# Patient Record
Sex: FEMALE | Race: WHITE | NOT HISPANIC OR LATINO | URBAN - METROPOLITAN AREA
[De-identification: names, ages, dates, MRNs, and addresses within clinical notes are randomized per-mention and may not be internally consistent; named-entity substitution may affect disease eponyms.]

---

## 2017-03-02 ENCOUNTER — OFFICE VISIT (OUTPATIENT)
Dept: MEDICAL GROUP | Facility: CLINIC | Age: 47
End: 2017-03-02
Payer: OTHER MISCELLANEOUS

## 2017-03-02 VITALS
HEART RATE: 81 BPM | BODY MASS INDEX: 35.98 KG/M2 | TEMPERATURE: 98.4 F | WEIGHT: 190.6 LBS | DIASTOLIC BLOOD PRESSURE: 62 MMHG | OXYGEN SATURATION: 99 % | RESPIRATION RATE: 16 BRPM | HEIGHT: 61 IN | SYSTOLIC BLOOD PRESSURE: 110 MMHG

## 2017-03-02 DIAGNOSIS — R21 SKIN RASH: ICD-10-CM

## 2017-03-02 PROCEDURE — 99213 OFFICE O/P EST LOW 20 MIN: CPT | Performed by: NURSE PRACTITIONER

## 2017-03-02 RX ORDER — TRIAMCINOLONE ACETONIDE 0.25 MG/G
1 CREAM TOPICAL 2 TIMES DAILY
Qty: 1 TUBE | Refills: 0 | Status: SHIPPED | OUTPATIENT
Start: 2017-03-02 | End: 2017-11-17 | Stop reason: SDUPTHER

## 2017-03-02 ASSESSMENT — ENCOUNTER SYMPTOMS
TINGLING: 0
CHILLS: 0
FEVER: 0

## 2017-03-02 NOTE — MR AVS SNAPSHOT
"        Nanda Brenda   3/2/2017 2:20 PM   Office Visit   MRN: [id number]    Department:  [facility]   Dept Phone:  [phone]    Description:  Female : 1970   Provider:  HARJEET Howard           Reason for Visit     Rash abdomen area very itchy      Allergies as of 3/2/2017     Allergen Noted Reactions    Pcn [Penicillins] 2014       Childhood reaction      You were diagnosed with     Skin rash   [625280]         Vital Signs     Blood Pressure Pulse Temperature Respirations Height Weight    110/62 mmHg 81 36.9 °C (98.4 °F) 16 1.549 m (5' 1\") 86.456 kg (190 lb 9.6 oz)    Body Mass Index Oxygen Saturation Breastfeeding? Smoking Status          36.03 kg/m2 99% No Never Smoker         Basic Information     Date Of Birth Sex Race Ethnicity Preferred Language    1970 Female White Non- English      Your appointments     May 19, 2017  8:40 AM   ANNUAL EXAM PREVENTATIVE with Demi Ware A.P.NRose Mary   [address] [address] [phone]              Problem List              ICD-10-CM Priority Class Noted - Resolved    Chronic headaches R51   2014 - Present    Iron deficiency anemia D50.9   5/10/2014 - Present    Vitamin D deficiency E55.9   5/10/2014 - Present    Dyslipidemia E78.5   5/10/2014 - Present    Thoracic back pain M54.6   2014 - Present    Prediabetes R73.03   2014 - Present    BMI 37.0-37.9, adult Z68.37   2015 - Present    Bowel habit changes R19.4   2015 - Present    Elevated liver enzymes R74.8   2015 - Present    Acute maxillary sinusitis J01.00   2015 - Present    Low back pain M54.5   2015 - Present    Abnormal urine odor R82.90   2015 - Present      Health Maintenance        Date Due Completion Dates    IMM DTaP/Tdap/Td Vaccine (1 - Tdap) 10/24/1989 ---    MAMMOGRAM 2013 (Prv Comp)    Override on 2012: Previously completed (normal)   " PAP SMEAR 4/30/2015 4/30/2012 (Prv Comp)    Override on 4/30/2012: Previously completed (Andrés ablation; was seeing GYN in [place])    IMM INFLUENZA (1) 9/1/2016 ---            Current Immunizations     No immunizations on file.      Below and/or attached are the medications your provider expects you to take. Review all of your home medications and newly ordered medications with your provider and/or pharmacist. Follow medication instructions as directed by your provider and/or pharmacist. Please keep your medication list with you and share with your provider. Update the information when medications are discontinued, doses are changed, or new medications (including over-the-counter products) are added; and carry medication information at all times in the event of emergency situations     Allergies:  PCN - (reactions not documented)               Medications  Valid as of: March 02, 2017 -  3:34 PM    Generic Name Brand Name Tablet Size Instructions for use    Albuterol Sulfate (Aero Soln) albuterol 108 (90 BASE) MCG/ACT Inhale 2 Puffs by mouth every 6 hours as needed for Shortness of Breath.        Benzonatate (Cap) TESSALON 100 MG Take 100 mg by mouth 3 times a day as needed for Cough.        Cefuroxime Axetil (Tab) CEFTIN 250 MG Take 1 Tab by mouth 2 times a day.        Ibuprofen (Tab) MOTRIN 800 MG Take 1 Tab by mouth every 8 hours as needed. AS NEEDED FOR MILD PAIN        Phenyleph-Promethazine-Cod (Syrup) PHENERGAN VC CODEINE 5-6.25-10 MG/5ML Take 5 mL by mouth every 8 hours as needed.        Triamcinolone Acetonide (Cream) KENALOG 0.025 % Apply 1 Application to affected area(s) 2 times a day.        .                 Medicines prescribed today were sent to:     [facility]  [place] - [address]    [address] [place] [place] [address]    Phone: [phone] Fax: [phone]    Open 24 Hours?: No    [facility] [place] - [address]    [address]  [place] [address]    Phone: [phone] Fax: [phone]    Open 24 Hours?: No      Medication refill instructions:       If your prescription bottle indicates you have medication refills left, it is not necessary to call your provider’s office. Please contact your pharmacy and they will refill your medication.    If your prescription bottle indicates you do not have any refills left, you may request refills at any time through one of the following ways: The online [employer] system (except Urgent Care), by calling your provider’s office, or by asking your pharmacy to contact your provider’s office with a refill request. Medication refills are processed only during regular business hours and may not be available until the next business day. Your provider may request additional information or to have a follow-up visit with you prior to refilling your medication.   *Please Note: Medication refills are assigned a new Rx number when refilled electronically. Your pharmacy may indicate that no refills were authorized even though a new prescription for the same medication is available at the pharmacy. Please request the medicine by name with the pharmacy before contacting your provider for a refill.           [employer] Access Code: Activation code not generated  Current [employer] Status: Active

## 2017-03-02 NOTE — PROGRESS NOTES
Chief Complaint   Patient presents with   • Rash     abdomen area very itchy       HISTORY OF PRESENT ILLNESS: Patient is a 46 y.o. female established patient who presents today due to rashes on her abdomen. Patient reported that she gets rashes here and there intermittently. Last time she got rashes on her elbow and she was given some cream which she does not remember the name and the rashes went away. The patient reported that she started feeling itchiness and notice rashes about a few days ago. She denies any change to laundry product, cosmetic products, no new underwear, she washes her pen every day. She tries to put some lotion that the itchiness is still there. However the rashes that seem to go down a little bit. She denies fever or chills or any burning sensation tingling.     Patient Active Problem List    Diagnosis Date Noted   • Acute maxillary sinusitis 12/08/2015   • Low back pain 12/08/2015   • Abnormal urine odor 12/08/2015   • BMI 37.0-37.9, adult 07/07/2015   • Bowel habit changes 07/07/2015   • Elevated liver enzymes 07/07/2015   • Prediabetes 12/30/2014   • Thoracic back pain 12/22/2014   • Iron deficiency anemia 05/10/2014   • Vitamin D deficiency 05/10/2014   • Dyslipidemia 05/10/2014   • Chronic headaches 04/30/2014       Allergies:Pcn    Current Outpatient Prescriptions   Medication Sig Dispense Refill   • ibuprofen (MOTRIN) 800 MG Tab Take 1 Tab by mouth every 8 hours as needed. AS NEEDED FOR MILD PAIN 90 Tab 0   • benzonatate (TESSALON) 100 MG Cap Take 100 mg by mouth 3 times a day as needed for Cough.     • cefUROXime (CEFTIN) 250 MG Tab Take 1 Tab by mouth 2 times a day. 20 Tab 0   • albuterol (VENTOLIN OR PROVENTIL) 108 (90 BASE) MCG/ACT Aero Soln inhalation aerosol Inhale 2 Puffs by mouth every 6 hours as needed for Shortness of Breath. 8.5 g 3   • prometh-phenylephrine-codeine (PHENERGAN VC CODEINE) 5-6.25-10 MG/5ML Syrup Take 5 mL by mouth every 8 hours as needed. 120 mL 0     No  "current facility-administered medications for this visit.       Social History   Substance Use Topics   • Smoking status: Never Smoker    • Smokeless tobacco: Never Used   • Alcohol Use: No       No family status information on file.     Family History   Problem Relation Age of Onset   • Cancer Maternal Grandmother 56     pancreatic   • Heart Disease Maternal Grandfather 40     MI   • Diabetes Paternal Grandmother    • Stroke Neg Hx          ROS:  Review of Systems   Constitutional: Negative for fever and chills.   Skin: Positive for itching and rash.   Neurological: Negative for tingling.        Objective:     Blood pressure 110/62, pulse 81, temperature 36.9 °C (98.4 °F), resp. rate 16, height 1.549 m (5' 1\"), weight 86.456 kg (190 lb 9.6 oz), SpO2 99 %, not currently breastfeeding.     Physical Exam:  Physical Exam   Constitutional: She is oriented to person, place, and time and well-developed, well-nourished, and in no distress.   HENT:   Head: Normocephalic and atraumatic.   Eyes: Conjunctivae are normal.   Neck: Neck supple.   Cardiovascular: Normal rate.    Abdominal: Soft. She exhibits no distension. There is no tenderness.       Small rashes, flat on the circled areas, the area is lining up the level of her wes waistband.    Musculoskeletal: Normal range of motion.   Neurological: She is alert and oriented to person, place, and time.   Skin: Skin is warm. Rash noted. No erythema.   Vitals reviewed.        Assessment/Plan:  1. Skin rash, most likely contact dermitits  - triamcinolone acetonide (KENALOG) 0.025 % Cream; Apply 1 Application to affected area(s) 2 times a day.  Dispense: 1 Tube; Refill: 0       "

## 2017-03-06 ENCOUNTER — PATIENT MESSAGE (OUTPATIENT)
Dept: MEDICAL GROUP | Facility: PHYSICIAN GROUP | Age: 47
End: 2017-03-06

## 2017-03-06 DIAGNOSIS — D50.8 IRON DEFICIENCY ANEMIA SECONDARY TO INADEQUATE DIETARY IRON INTAKE: ICD-10-CM

## 2017-03-07 ENCOUNTER — HOSPITAL ENCOUNTER (OUTPATIENT)
Dept: LAB | Facility: MEDICAL CENTER | Age: 47
End: 2017-03-07
Attending: NURSE PRACTITIONER
Payer: OTHER MISCELLANEOUS

## 2017-03-07 DIAGNOSIS — D50.8 IRON DEFICIENCY ANEMIA SECONDARY TO INADEQUATE DIETARY IRON INTAKE: ICD-10-CM

## 2017-03-07 LAB
BASOPHILS # BLD AUTO: 0.05 K/UL (ref 0–0.12)
BASOPHILS NFR BLD AUTO: 0.5 % (ref 0–1.8)
EOSINOPHIL # BLD: 0.49 K/UL (ref 0–0.51)
EOSINOPHIL NFR BLD AUTO: 5.2 % (ref 0–6.9)
ERYTHROCYTE [DISTWIDTH] IN BLOOD BY AUTOMATED COUNT: 44.4 FL (ref 35.9–50)
HCT VFR BLD AUTO: 38.6 % (ref 37–47)
HGB BLD-MCNC: 12.1 G/DL (ref 12–16)
IMM GRANULOCYTES # BLD AUTO: 0.02 K/UL (ref 0–0.11)
IMM GRANULOCYTES NFR BLD AUTO: 0.2 % (ref 0–0.9)
LYMPHOCYTES # BLD: 2.78 K/UL (ref 1–4.8)
LYMPHOCYTES NFR BLD AUTO: 29.5 % (ref 22–41)
MCH RBC QN AUTO: 26.6 PG (ref 27–33)
MCHC RBC AUTO-ENTMCNC: 31.3 G/DL (ref 33.6–35)
MCV RBC AUTO: 84.8 FL (ref 81.4–97.8)
MONOCYTES # BLD: 0.49 K/UL (ref 0–0.85)
MONOCYTES NFR BLD AUTO: 5.2 % (ref 0–13.4)
NEUTROPHILS # BLD: 5.6 K/UL (ref 2–7.15)
NEUTROPHILS NFR BLD AUTO: 59.4 % (ref 44–72)
NRBC # BLD AUTO: 0 K/UL
NRBC BLD-RTO: 0 /100 WBC
PLATELET # BLD AUTO: 341 K/UL (ref 164–446)
PMV BLD AUTO: 11.6 FL (ref 9–12.9)
RBC # BLD AUTO: 4.55 M/UL (ref 4.2–5.4)
T4 FREE SERPL-MCNC: 0.8 NG/DL (ref 0.53–1.43)
TSH SERPL DL<=0.005 MIU/L-ACNC: 3.56 UIU/ML (ref 0.3–3.7)
WBC # BLD AUTO: 9.4 K/UL (ref 4.8–10.8)

## 2017-03-07 PROCEDURE — 85025 COMPLETE CBC W/AUTO DIFF WBC: CPT

## 2017-03-07 PROCEDURE — 36415 COLL VENOUS BLD VENIPUNCTURE: CPT

## 2017-03-07 PROCEDURE — 84439 ASSAY OF FREE THYROXINE: CPT

## 2017-03-07 PROCEDURE — 84443 ASSAY THYROID STIM HORMONE: CPT

## 2017-05-19 ENCOUNTER — HOSPITAL ENCOUNTER (OUTPATIENT)
Facility: MEDICAL CENTER | Age: 47
End: 2017-05-19
Attending: NURSE PRACTITIONER
Payer: OTHER MISCELLANEOUS

## 2017-05-19 ENCOUNTER — OFFICE VISIT (OUTPATIENT)
Dept: MEDICAL GROUP | Facility: PHYSICIAN GROUP | Age: 47
End: 2017-05-19
Payer: OTHER MISCELLANEOUS

## 2017-05-19 VITALS
BODY MASS INDEX: 36.25 KG/M2 | TEMPERATURE: 97.3 F | WEIGHT: 192 LBS | HEIGHT: 61 IN | SYSTOLIC BLOOD PRESSURE: 118 MMHG | RESPIRATION RATE: 16 BRPM | OXYGEN SATURATION: 99 % | DIASTOLIC BLOOD PRESSURE: 74 MMHG | HEART RATE: 85 BPM

## 2017-05-19 DIAGNOSIS — Z01.419 WELL WOMAN EXAM: ICD-10-CM

## 2017-05-19 DIAGNOSIS — G44.229 CHRONIC TENSION-TYPE HEADACHE, NOT INTRACTABLE: ICD-10-CM

## 2017-05-19 DIAGNOSIS — G47.19 EXCESSIVE DAYTIME SLEEPINESS: ICD-10-CM

## 2017-05-19 DIAGNOSIS — R74.8 ELEVATED LIVER ENZYMES: ICD-10-CM

## 2017-05-19 DIAGNOSIS — E04.9 ENLARGED THYROID: ICD-10-CM

## 2017-05-19 DIAGNOSIS — M54.6 CHRONIC THORACIC BACK PAIN, UNSPECIFIED BACK PAIN LATERALITY: ICD-10-CM

## 2017-05-19 DIAGNOSIS — E55.9 VITAMIN D DEFICIENCY: ICD-10-CM

## 2017-05-19 DIAGNOSIS — G89.29 CHRONIC THORACIC BACK PAIN, UNSPECIFIED BACK PAIN LATERALITY: ICD-10-CM

## 2017-05-19 LAB — CYTOLOGY REG CYTOL: NORMAL

## 2017-05-19 PROCEDURE — 99214 OFFICE O/P EST MOD 30 MIN: CPT | Performed by: NURSE PRACTITIONER

## 2017-05-19 PROCEDURE — 88175 CYTOPATH C/V AUTO FLUID REDO: CPT

## 2017-05-19 ASSESSMENT — PATIENT HEALTH QUESTIONNAIRE - PHQ9: CLINICAL INTERPRETATION OF PHQ2 SCORE: 0

## 2017-05-19 NOTE — MR AVS SNAPSHOT
"        Nanda Brenda   2017 8:40 AM   Office Visit   MRN: [id number]    Department:  [facility]   Dept Phone:  [phone]    Description:  Female : 1970   Provider:  JUSTO Singh           Reason for Visit     Gynecologic Exam mammo order needed      Allergies as of 2017     Allergen Noted Reactions    Pcn [Penicillins] 2014       Childhood reaction      You were diagnosed with     Chronic tension-type headache, not intractable   [328703]       Vitamin D deficiency   [6740038]       Chronic thoracic back pain, unspecified back pain laterality   [3943863]       Elevated liver enzymes   [359630]       Excessive daytime sleepiness   [5813000]       Well woman exam   [289883]       Enlarged thyroid   [689349]         Vital Signs     Blood Pressure Pulse Temperature Respirations Height Weight    118/74 mmHg 85 36.3 °C (97.3 °F) 16 1.549 m (5' 0.98\") 87.091 kg (192 lb)    Body Mass Index Oxygen Saturation Smoking Status             36.30 kg/m2 99% Never Smoker          Basic Information     Date Of Birth Sex Race Ethnicity Preferred Language    1970 Female White Non- English      Problem List              ICD-10-CM Priority Class Noted - Resolved    Chronic headaches R51   2014 - Present    Iron deficiency anemia D50.9   5/10/2014 - Present    Vitamin D deficiency E55.9   5/10/2014 - Present    Dyslipidemia E78.5   5/10/2014 - Present    Thoracic back pain M54.6   2014 - Present    Elevated blood sugar R73.9   2014 - Present    BMI 37.0-37.9, adult Z68.37   2015 - Present    Bowel habit changes R19.4   2015 - Present    Elevated liver enzymes R74.8   2015 - Present    Low back pain M54.5   2015 - Present    Excessive daytime sleepiness G47.19   2017 - Present    Well woman exam Z01.419   2017 - Present    Enlarged thyroid E01.0   2017 - Present      Health Maintenance        Date Due Completion Dates    IMM " DTaP/Tdap/Td Vaccine (1 - Tdap) 10/24/1989 ---    MAMMOGRAM 4/30/2013 4/30/2012 (Prv Comp)    Override on 4/30/2012: Previously completed (normal)    PAP SMEAR 4/30/2015 4/30/2012 (Prv Comp)    Override on 4/30/2012: Previously completed (Novasure ablation; was seeing GYN in [place])            Current Immunizations     No immunizations on file.      Below and/or attached are the medications your provider expects you to take. Review all of your home medications and newly ordered medications with your provider and/or pharmacist. Follow medication instructions as directed by your provider and/or pharmacist. Please keep your medication list with you and share with your provider. Update the information when medications are discontinued, doses are changed, or new medications (including over-the-counter products) are added; and carry medication information at all times in the event of emergency situations     Allergies:  PCN - (reactions not documented)               Medications  Valid as of: May 19, 2017 -  9:11 AM    Generic Name Brand Name Tablet Size Instructions for use    Albuterol Sulfate (Aero Soln) albuterol 108 (90 BASE) MCG/ACT Inhale 2 Puffs by mouth every 6 hours as needed for Shortness of Breath.        Benzonatate (Cap) TESSALON 100 MG Take 100 mg by mouth 3 times a day as needed for Cough.        Cefuroxime Axetil (Tab) CEFTIN 250 MG Take 1 Tab by mouth 2 times a day.        Ibuprofen (Tab) MOTRIN 800 MG Take 1 Tab by mouth every 8 hours as needed. AS NEEDED FOR MILD PAIN        NON SPECIFIED   Overnight Oximetry.  Sleep disturbance  Daytime sleepiness        Phenyleph-Promethazine-Cod (Syrup) PHENERGAN VC CODEINE 5-6.25-10 MG/5ML Take 5 mL by mouth every 8 hours as needed.        Triamcinolone Acetonide (Cream) KENALOG 0.025 % Apply 1 Application to affected area(s) 2 times a day.        .                 Medicines prescribed today were sent to:     [facility] - [place] - [address]     [address] [place] [address]    Phone: [phone] Fax: [phone]    Open 24 Hours?: No    [facility] [place] - [address]    [address] [place] [address]    Phone: [phone] Fax: [phone]    Open 24 Hours?: No      Medication refill instructions:       If your prescription bottle indicates you have medication refills left, it is not necessary to call your provider’s office. Please contact your pharmacy and they will refill your medication.    If your prescription bottle indicates you do not have any refills left, you may request refills at any time through one of the following ways: The online [employer] system (except Urgent Care), by calling your provider’s office, or by asking your pharmacy to contact your provider’s office with a refill request. Medication refills are processed only during regular business hours and may not be available until the next business day. Your provider may request additional information or to have a follow-up visit with you prior to refilling your medication.   *Please Note: Medication refills are assigned a new Rx number when refilled electronically. Your pharmacy may indicate that no refills were authorized even though a new prescription for the same medication is available at the pharmacy. Please request the medicine by name with the pharmacy before contacting your provider for a refill.        Your To Do List     Future Labs/Procedures Complete By Expires    COMP METABOLIC PANEL  As directed 5/19/2018    MA-SCREEN MAMMO W/CAD-BILAT  As directed 5/19/2018    US-SOFT TISSUES OF HEAD - NECK  As directed 5/19/2018      Referral     A referral request has been sent to our patient care coordination department. Please allow 3-5 business days for us to process this request and contact you either by phone or mail. If you do not hear from us by the 5th business day, please call us at [phone].           [employer] Access Code:  Activation code not generated  Current MyChart Status: Active

## 2017-05-19 NOTE — PROGRESS NOTES
S:  Nanda Gutierrez is a 46 y.o.,femalewho presents today for her Pap and GYN exam.  Her LMP was six years ago.  She is still having no menses, due to ablation.  Her form of contraception is  none    Chronic headaches  Patient has better control of headaches. She states that the headaches are less.     Vitamin D deficiency  Patient has difficulty remembering to take her pills.     Thoracic back pain  Patient has chronic pain to mid back, but has noticed that she has lower back pain. The pain is worsened by housework. Patient has not attended PT.     Elevated liver enzymes  Patient went to GI. Full work up. Has fatty liver disease.     Excessive daytime sleepiness  Patient complains of daytime sleepiness. She feels fully exhausted when she wakes up. She sleeps through the night.       She is , P:0.    She has had an Abnormal Pap previously.  Her last Mammogram was done: due at 34 yo will order.     Her preventative health screens are not up to date.  GYN ROS:  normal menses, no abnormal bleeding, pelvic pain or discharge, no breast pain or new or enlarging lumps on self exam, she complains of dry vaginal tissue during intercourse.    Patient Active Problem List    Diagnosis Date Noted   • Excessive daytime sleepiness 2017   • Well woman exam 2017   • Low back pain 2015   • BMI 37.0-37.9, adult 2015   • Bowel habit changes 2015   • Elevated liver enzymes 2015   • Elevated blood sugar 2014   • Thoracic back pain 2014   • Iron deficiency anemia 05/10/2014   • Vitamin D deficiency 05/10/2014   • Dyslipidemia 05/10/2014   • Chronic headaches 2014     Current Outpatient Prescriptions on File Prior to Visit   Medication Sig Dispense Refill   • triamcinolone acetonide (KENALOG) 0.025 % Cream Apply 1 Application to affected area(s) 2 times a day. 1 Tube 0   • ibuprofen (MOTRIN) 800 MG Tab Take 1 Tab by mouth every 8 hours as needed. AS NEEDED FOR MILD PAIN 90 Tab 0   •  "benzonatate (TESSALON) 100 MG Cap Take 100 mg by mouth 3 times a day as needed for Cough.     • cefUROXime (CEFTIN) 250 MG Tab Take 1 Tab by mouth 2 times a day. 20 Tab 0   • albuterol (VENTOLIN OR PROVENTIL) 108 (90 BASE) MCG/ACT Aero Soln inhalation aerosol Inhale 2 Puffs by mouth every 6 hours as needed for Shortness of Breath. 8.5 g 3   • prometh-phenylephrine-codeine (PHENERGAN VC CODEINE) 5-6.25-10 MG/5ML Syrup Take 5 mL by mouth every 8 hours as needed. 120 mL 0     No current facility-administered medications on file prior to visit.     Social History   Substance Use Topics   • Smoking status: Never Smoker    • Smokeless tobacco: Never Used   • Alcohol Use: No       O: /74 mmHg  Pulse 85  Temp(Src) 36.3 °C (97.3 °F)  Resp 16  Ht 1.549 m (5' 0.98\")  Wt 87.091 kg (192 lb)  BMI 36.30 kg/m2  SpO2 99%  Vitals Noted and Reviewed  Neck: enlarged thyroid and tender  Breast: breasts symmetric and no dominant or suspicious mass  Lungs: normal to percussion and auscultation  Heart: normal  Vulva: grossly unremarkable  Vagina: no abnormal discharge  Cervix: Parous, nonfriable, no surface lesions identified, Pap was performed  Uterus: Normal shape, position and consistency  Bimanual exam: No uteromegaly, negative chandelier sign, adnexa freely movable and without enlargements bilaterally  Rectal: not performed    Assessment:  NORMAL GYN Exam  Enlarged thyroid.      Plan:   mammogram  pap smear  US for neck will notify of results  OPO for daytime sleepiness   REPLENS OTC for vaginal dryness  PT for low back pain.   return annually or prn    Pap processed and sent to the lab.    Recommend follow up 6 months or prn.      "

## 2017-05-19 NOTE — ASSESSMENT & PLAN NOTE
Patient complains of daytime sleepiness. She feels fully exhausted when she wakes up. She sleeps through the night.

## 2017-05-19 NOTE — ASSESSMENT & PLAN NOTE
Patient has chronic pain to mid back, but has noticed that she has lower back pain. The pain is worsened by housework. Patient has not attended PT.

## 2017-05-22 ENCOUNTER — TELEPHONE (OUTPATIENT)
Dept: MEDICAL GROUP | Facility: PHYSICIAN GROUP | Age: 47
End: 2017-05-22

## 2017-05-24 ENCOUNTER — TELEPHONE (OUTPATIENT)
Dept: MEDICAL GROUP | Facility: PHYSICIAN GROUP | Age: 47
End: 2017-05-24

## 2017-05-27 ENCOUNTER — HOSPITAL ENCOUNTER (OUTPATIENT)
Dept: LAB | Facility: MEDICAL CENTER | Age: 47
End: 2017-05-27
Attending: NURSE PRACTITIONER
Payer: OTHER MISCELLANEOUS

## 2017-05-27 DIAGNOSIS — R74.8 ELEVATED LIVER ENZYMES: ICD-10-CM

## 2017-05-27 LAB
ALBUMIN SERPL BCP-MCNC: 4 G/DL (ref 3.2–4.9)
ALBUMIN/GLOB SERPL: 1 G/DL
ALP SERPL-CCNC: 103 U/L (ref 30–99)
ALT SERPL-CCNC: 18 U/L (ref 2–50)
ANION GAP SERPL CALC-SCNC: 9 MMOL/L (ref 0–11.9)
AST SERPL-CCNC: 34 U/L (ref 12–45)
BILIRUB SERPL-MCNC: 0.4 MG/DL (ref 0.1–1.5)
BUN SERPL-MCNC: 11 MG/DL (ref 8–22)
CALCIUM SERPL-MCNC: 9.2 MG/DL (ref 8.5–10.5)
CHLORIDE SERPL-SCNC: 104 MMOL/L (ref 96–112)
CO2 SERPL-SCNC: 24 MMOL/L (ref 20–33)
CREAT SERPL-MCNC: 0.64 MG/DL (ref 0.5–1.4)
GFR SERPL CREATININE-BSD FRML MDRD: >60 ML/MIN/1.73 M 2
GLOBULIN SER CALC-MCNC: 4.2 G/DL (ref 1.9–3.5)
GLUCOSE SERPL-MCNC: 102 MG/DL (ref 65–99)
POTASSIUM SERPL-SCNC: 4.1 MMOL/L (ref 3.6–5.5)
PROT SERPL-MCNC: 8.2 G/DL (ref 6–8.2)
SODIUM SERPL-SCNC: 137 MMOL/L (ref 135–145)

## 2017-05-27 PROCEDURE — 80053 COMPREHEN METABOLIC PANEL: CPT

## 2017-05-27 PROCEDURE — 36415 COLL VENOUS BLD VENIPUNCTURE: CPT

## 2017-06-01 ENCOUNTER — HOSPITAL ENCOUNTER (OUTPATIENT)
Dept: RADIOLOGY | Facility: MEDICAL CENTER | Age: 47
End: 2017-06-01
Attending: NURSE PRACTITIONER
Payer: OTHER MISCELLANEOUS

## 2017-06-01 DIAGNOSIS — E04.9 ENLARGED THYROID: ICD-10-CM

## 2017-06-01 PROCEDURE — 76536 US EXAM OF HEAD AND NECK: CPT

## 2017-06-02 ENCOUNTER — PATIENT MESSAGE (OUTPATIENT)
Dept: MEDICAL GROUP | Facility: PHYSICIAN GROUP | Age: 47
End: 2017-06-02

## 2017-06-02 DIAGNOSIS — E07.9 THYROID MASS: ICD-10-CM

## 2017-06-15 PROBLEM — E04.1 THYROID NODULE: Status: ACTIVE | Noted: 2017-05-19

## 2017-06-27 ENCOUNTER — HOSPITAL ENCOUNTER (OUTPATIENT)
Dept: RADIOLOGY | Facility: MEDICAL CENTER | Age: 47
End: 2017-06-27
Attending: OTOLARYNGOLOGY
Payer: OTHER MISCELLANEOUS

## 2017-06-27 DIAGNOSIS — D49.7 TUMOR, THYROID: ICD-10-CM

## 2017-06-27 PROCEDURE — 10022 HCHG FINE NEEDLE ASP W/IMAGING GUIDANCE: CPT

## 2017-06-27 PROCEDURE — 76942 ECHO GUIDE FOR BIOPSY: CPT

## 2017-06-27 PROCEDURE — 88112 CYTOPATH CELL ENHANCE TECH: CPT

## 2017-06-27 NOTE — PROGRESS NOTES
US guided left thyroid nodule fine needle aspiration done by Dr. Garcia assisted by EMMA Penn RN and Jaqueline Metzger; left anterior aspect of neck access site; 1 jar of cytolyt obtained and sent to pathology lab; pt tolerated the procedure well; pt hemodynamically stable pre/intra/post procedure; all questions and concerns answered prior to being d/c; patient provided with appropriate education for procedure; pt d/c home.

## 2017-09-25 ENCOUNTER — PATIENT MESSAGE (OUTPATIENT)
Dept: MEDICAL GROUP | Facility: PHYSICIAN GROUP | Age: 47
End: 2017-09-25

## 2017-11-17 DIAGNOSIS — R21 SKIN RASH: ICD-10-CM

## 2017-11-17 RX ORDER — TRIAMCINOLONE ACETONIDE 0.25 MG/G
CREAM TOPICAL
Qty: 1 TUBE | Refills: 3 | Status: SHIPPED | OUTPATIENT
Start: 2017-11-17 | End: 2018-12-05

## 2018-08-06 ENCOUNTER — OFFICE VISIT (OUTPATIENT)
Dept: URGENT CARE | Facility: PHYSICIAN GROUP | Age: 48
End: 2018-08-06
Payer: MEDICAID

## 2018-08-06 VITALS
BODY MASS INDEX: 34.47 KG/M2 | HEIGHT: 60 IN | TEMPERATURE: 98.7 F | OXYGEN SATURATION: 98 % | DIASTOLIC BLOOD PRESSURE: 72 MMHG | SYSTOLIC BLOOD PRESSURE: 112 MMHG | RESPIRATION RATE: 16 BRPM | WEIGHT: 175.6 LBS | HEART RATE: 83 BPM

## 2018-08-06 DIAGNOSIS — J01.00 ACUTE MAXILLARY SINUSITIS, RECURRENCE NOT SPECIFIED: ICD-10-CM

## 2018-08-06 DIAGNOSIS — R21 RASH: ICD-10-CM

## 2018-08-06 PROCEDURE — 99214 OFFICE O/P EST MOD 30 MIN: CPT | Performed by: PHYSICIAN ASSISTANT

## 2018-08-06 RX ORDER — IBUPROFEN 800 MG/1
800 TABLET ORAL EVERY 8 HOURS PRN
Qty: 60 TAB | Refills: 0 | Status: SHIPPED | OUTPATIENT
Start: 2018-08-06 | End: 2018-12-05

## 2018-08-06 RX ORDER — DOXYCYCLINE HYCLATE 100 MG
100 TABLET ORAL 2 TIMES DAILY
Qty: 14 TAB | Refills: 0 | Status: SHIPPED | OUTPATIENT
Start: 2018-08-06 | End: 2018-08-13

## 2018-08-06 RX ORDER — FLUTICASONE PROPIONATE 50 MCG
1 SPRAY, SUSPENSION (ML) NASAL DAILY
Qty: 16 G | Refills: 0 | Status: SHIPPED | OUTPATIENT
Start: 2018-08-06 | End: 2018-12-05

## 2018-08-06 RX ORDER — TRIAMCINOLONE ACETONIDE 1 MG/G
1 CREAM TOPICAL 2 TIMES DAILY
Qty: 1 TUBE | Refills: 0 | Status: SHIPPED | OUTPATIENT
Start: 2018-08-06 | End: 2018-12-05

## 2018-08-06 ASSESSMENT — ENCOUNTER SYMPTOMS
ABDOMINAL PAIN: 0
SINUS PRESSURE: 1
COUGH: 0
NECK PAIN: 0
FOCAL WEAKNESS: 0
HEADACHES: 1
SORE THROAT: 0
SENSORY CHANGE: 0
NAUSEA: 0
SHORTNESS OF BREATH: 0
TINGLING: 0
DIARRHEA: 0
PALPITATIONS: 0
CHILLS: 0
FEVER: 0
VOMITING: 0
FATIGUE: 0
WHEEZING: 0
SINUS PAIN: 1
SPUTUM PRODUCTION: 0

## 2018-08-06 ASSESSMENT — PAIN SCALES - GENERAL: PAINLEVEL: 6=MODERATE PAIN

## 2018-08-06 NOTE — PROGRESS NOTES
Subjective:      Nanda Gutierrez is a 47 y.o. female who presents with Headache; Jaw Pain (does not know if it's her teeth or sinus); and Otalgia            Sinus Problem   This is a new problem. Episode onset: 5 days. The problem has been gradually worsening since onset. There has been no fever. The pain is moderate (moderate pain- headache, generalized tooth pain, facial pain, jaw pain ). Associated symptoms include congestion, ear pain, headaches and sinus pressure. Pertinent negatives include no chills, coughing, neck pain, shortness of breath, sneezing or sore throat. Past treatments include acetaminophen. The treatment provided mild relief.   Rash   This is a chronic problem. Episode onset: several months  The problem has been waxing and waning since onset. The affected locations include the abdomen. Associated symptoms include congestion. Pertinent negatives include no cough, diarrhea, facial edema, fatigue, fever, shortness of breath, sore throat or vomiting. Past treatments include topical steroids (patient doesn't feel topical steroids are strong enough). The treatment provided mild relief.       Past Medical History:   Diagnosis Date   • Anemia    • Chronic headaches 4/30/2014    X years Since a kid Motrin helps - only thing that helps Would like rx   • Hyperlipidemia    • Urinary tract infection, site not specified        History reviewed. No pertinent surgical history.    Family History   Problem Relation Age of Onset   • Cancer Maternal Grandmother 56        pancreatic   • Heart Disease Maternal Grandfather 40        MI   • Diabetes Paternal Grandmother    • Stroke Neg Hx        Allergies   Allergen Reactions   • Pcn [Penicillins]      Childhood reaction         Medications, Allergies, and current problem list reviewed today in Epic    Review of Systems   Constitutional: Negative for chills, fatigue, fever and malaise/fatigue.   HENT: Positive for congestion, ear pain, sinus pain and sinus pressure.  Negative for ear discharge, sneezing and sore throat.    Respiratory: Negative for cough, sputum production, shortness of breath and wheezing.    Cardiovascular: Negative for chest pain, palpitations and leg swelling.   Gastrointestinal: Negative for abdominal pain, diarrhea, nausea and vomiting.   Musculoskeletal: Negative for neck pain.   Skin: Positive for rash.   Neurological: Positive for headaches. Negative for tingling, sensory change and focal weakness.     All other systems reviewed and are negative.        Objective:     /72   Pulse 83   Temp 37.1 °C (98.7 °F)   Resp 16   Ht 1.524 m (5')   Wt 79.7 kg (175 lb 9.6 oz)   SpO2 98%   BMI 34.29 kg/m²      Physical Exam   Constitutional: She is oriented to person, place, and time. She appears well-developed and well-nourished. No distress.   HENT:   Head: Normocephalic and atraumatic.   Right Ear: Tympanic membrane, external ear and ear canal normal.   Left Ear: Tympanic membrane, external ear and ear canal normal.   Nose: Mucosal edema and rhinorrhea present. Right sinus exhibits maxillary sinus tenderness and frontal sinus tenderness. Left sinus exhibits maxillary sinus tenderness and frontal sinus tenderness.   Mouth/Throat: Uvula is midline, oropharynx is clear and moist and mucous membranes are normal. Abnormal dentition. No dental abscesses or dental caries.   No TTP with tapping teeth with tongue depressor   Eyes: Conjunctivae are normal.   Neck: Neck supple.   Cardiovascular: Normal rate, regular rhythm and normal heart sounds.  Exam reveals no gallop and no friction rub.    No murmur heard.  Pulmonary/Chest: Effort normal and breath sounds normal. No respiratory distress. She has no wheezes. She has no rales.   Lymphadenopathy:     She has no cervical adenopathy.   Neurological: She is alert and oriented to person, place, and time. No cranial nerve deficit.   Skin: Skin is warm and dry. Rash noted. Rash is maculopapular.        Psychiatric:  She has a normal mood and affect. Her behavior is normal. Judgment and thought content normal.               Assessment/Plan:     1. Acute maxillary sinusitis, recurrence not specified    - doxycycline (VIBRAMYCIN) 100 MG Tab; Take 1 Tab by mouth 2 times a day for 7 days.  Dispense: 14 Tab; Refill: 0  - ibuprofen (MOTRIN) 800 MG Tab; Take 1 Tab by mouth every 8 hours as needed for Moderate Pain.  Dispense: 60 Tab; Refill: 0  - fluticasone (FLONASE) 50 MCG/ACT nasal spray; Spray 1 Spray in nose every day.  Dispense: 16 g; Refill: 0    2. Rash    - triamcinolone acetonide (KENALOG) 0.1 % Cream; Apply 1 Application to affected area(s) 2 times a day.  Dispense: 1 Tube; Refill: 0       Differential diagnoses, Supportive care, and indications for immediate follow-up discussed with patient.   Instructed to return to clinic or nearest emergency department for any change in condition, further concerns, or worsening of symptoms.    The patient demonstrated a good understanding and agreed with the treatment plan.      Ruby Plaza P.A.-C.

## 2018-12-05 ENCOUNTER — OFFICE VISIT (OUTPATIENT)
Dept: URGENT CARE | Facility: PHYSICIAN GROUP | Age: 48
End: 2018-12-05
Payer: MEDICAID

## 2018-12-05 VITALS
SYSTOLIC BLOOD PRESSURE: 118 MMHG | WEIGHT: 178 LBS | TEMPERATURE: 97.9 F | HEIGHT: 60 IN | DIASTOLIC BLOOD PRESSURE: 70 MMHG | RESPIRATION RATE: 14 BRPM | BODY MASS INDEX: 34.95 KG/M2 | OXYGEN SATURATION: 97 % | HEART RATE: 92 BPM

## 2018-12-05 DIAGNOSIS — L30.9 ECZEMA, UNSPECIFIED TYPE: ICD-10-CM

## 2018-12-05 DIAGNOSIS — R51.9 NONINTRACTABLE HEADACHE, UNSPECIFIED CHRONICITY PATTERN, UNSPECIFIED HEADACHE TYPE: ICD-10-CM

## 2018-12-05 DIAGNOSIS — J06.9 URI WITH COUGH AND CONGESTION: ICD-10-CM

## 2018-12-05 PROCEDURE — 99214 OFFICE O/P EST MOD 30 MIN: CPT | Performed by: PHYSICIAN ASSISTANT

## 2018-12-05 RX ORDER — BENZONATATE 200 MG/1
200 CAPSULE ORAL 3 TIMES DAILY PRN
Qty: 30 CAP | Refills: 0 | Status: SHIPPED | OUTPATIENT
Start: 2018-12-05 | End: 2019-01-29

## 2018-12-05 RX ORDER — TRIAMCINOLONE ACETONIDE 5 MG/G
CREAM TOPICAL
Qty: 60 G | Refills: 0 | Status: SHIPPED | OUTPATIENT
Start: 2018-12-05 | End: 2019-01-29 | Stop reason: SDUPTHER

## 2018-12-05 RX ORDER — IBUPROFEN 800 MG/1
800 TABLET ORAL EVERY 8 HOURS PRN
Qty: 30 TAB | Refills: 0 | Status: SHIPPED | OUTPATIENT
Start: 2018-12-05 | End: 2019-01-29

## 2018-12-05 RX ORDER — DOXYCYCLINE HYCLATE 100 MG
100 TABLET ORAL 2 TIMES DAILY
Qty: 20 TAB | Refills: 0 | Status: SHIPPED | OUTPATIENT
Start: 2018-12-05 | End: 2019-01-29

## 2018-12-06 NOTE — PROGRESS NOTES
Chief Complaint   Patient presents with   • Cold Symptoms     x2wks       HISTORY OF PRESENT ILLNESS: Patient is a 48 y.o. female who presents today for the following:    Patient comes in for evaluation of a two-week history of sinus congestion.  Patient reports pressure in her ears, difficulty breathing through her nose, and a sore throat from the postnasal drip.  She states her nose has been draining clear and yellow.  She started having a cough a couple of days ago.  She denies fever, night sweats, chills, body aches, and difficult to breathing.  Over-the-counter medication has not been helping.  She does report a headache from the nasal congestion.  Patient also has a history of chronic eczema and is asking for refill of her steroid cream.    Patient Active Problem List    Diagnosis Date Noted   • Excessive daytime sleepiness 05/19/2017   • Well woman exam 05/19/2017   • Thyroid nodule 05/19/2017   • Low back pain 12/08/2015   • BMI 37.0-37.9, adult 07/07/2015   • Bowel habit changes 07/07/2015   • Elevated liver enzymes 07/07/2015   • Elevated blood sugar 12/30/2014   • Thoracic back pain 12/22/2014   • Iron deficiency anemia 05/10/2014   • Vitamin D deficiency 05/10/2014   • Dyslipidemia 05/10/2014   • Chronic headaches 04/30/2014       Allergies:Pcn [penicillins]    Current Outpatient Prescriptions Ordered in [facility]   Medication Sig Dispense Refill   • ibuprofen (MOTRIN) 800 MG Tab Take 1 Tab by mouth every 8 hours as needed for Mild Pain or Moderate Pain. 30 Tab 0   • triamcinolone acetonide (KENALOG) 0.5 % Cream Apply to affected area up to 3 times a day. 60 g 0   • doxycycline (VIBRAMYCIN) 100 MG Tab Take 1 Tab by mouth 2 times a day. 20 Tab 0   • benzonatate (TESSALON) 200 MG capsule Take 1 Cap by mouth 3 times a day as needed for Cough. 30 Cap 0   • ibuprofen (MOTRIN) 800 MG Tab Take 1 Tab by mouth every 8 hours as needed. AS NEEDED FOR MILD PAIN 90 Tab 0   • albuterol (VENTOLIN OR PROVENTIL) 108 (90 BASE)  MCG/ACT Aero Soln inhalation aerosol Inhale 2 Puffs by mouth every 6 hours as needed for Shortness of Breath. 8.5 g 3     No current [facility]-ordered facility-administered medications on file.        Past Medical History:   Diagnosis Date   • Anemia    • Chronic headaches 4/30/2014    X years Since a kid Motrin helps - only thing that helps Would like rx   • Hyperlipidemia    • Thyroid nodule    • Urinary tract infection, site not specified        Social History   Substance Use Topics   • Smoking status: Never Smoker   • Smokeless tobacco: Never Used   • Alcohol use No       No family status information on file.     Family History   Problem Relation Age of Onset   • Cancer Maternal Grandmother 56        pancreatic   • Heart Disease Maternal Grandfather 40        MI   • Diabetes Paternal Grandmother    • Stroke Neg Hx        Review of Systems:  Constitutional ROS: No unexpected change in weight, No weakness, No fatigue  Eye ROS: No recent significant change in vision, No eye pain, redness, discharge  Ear ROS: No drainage, No tinnitus or vertigo, No recent change in hearing  Mouth/Throat ROS: No teeth or gum problems, No bleeding gums, No tongue complaints  Neck ROS: No swollen glands, No significant pain in neck  Pulmonary ROS: No chronic cough, sputum, or hemoptysis, No dyspnea on exertion, No wheezing  Cardiovascular ROS: No diaphoresis, No edema, No palpitations  Gastrointestinal ROS: No change in bowel habits, No significant change in appetite, No nausea, vomiting, diarrhea, or constipation  Musculoskeletal/Extremities ROS: No peripheral edema, No pain, redness or swelling on the joints  Hematologic/Lymphatic ROS: No chills, No night sweats, No weight loss  Skin/Integumentary ROS: No edema, No evidence of rash, No itching      Exam:  Blood pressure 118/70, pulse 92, temperature 36.6 °C (97.9 °F), temperature source Temporal, resp. rate 14, height 1.524 m (5'), weight 80.7 kg (178 lb), SpO2 97 %, not currently  breastfeeding.  General: Well developed, well nourished. No distress.  Eye: PERRL/EOMI; conjunctivae clear, lids normal.  ENMT: Lips without lesions, MMM. Oropharynx is clear. Bilateral TMs are within normal limits but with clear fluid posterior bilaterally.  Pulmonary: Unlabored respiratory effort. Lungs clear to auscultation, no wheezes, no rhonchi.  Cardiovascular: Regular rate and rhythm without murmur.    Neurologic: Grossly nonfocal. No facial asymmetry noted.  Lymph: No cervical lymphadenopathy noted.  Skin: Warm, dry, good turgor. No rashes in visible areas.   Psych: Normal mood. Alert and oriented x3. Judgment and insight is normal.    Assessment/Plan:  Discussed likely viral etiology given the mostly clear but occasionally yellow nasal drainage. Discussed appropriate over-the-counter symptomatic medication, and when to return to clinic. Fill if symptoms worsen at any point OR if they fail to improve over the next 7-10 days  1. URI with cough and congestion  doxycycline (VIBRAMYCIN) 100 MG Tab    benzonatate (TESSALON) 200 MG capsule   2. Eczema, unspecified type  triamcinolone acetonide (KENALOG) 0.5 % Cream   3. Nonintractable headache, unspecified chronicity pattern, unspecified headache type  ibuprofen (MOTRIN) 800 MG Tab

## 2019-01-29 ENCOUNTER — OFFICE VISIT (OUTPATIENT)
Dept: MEDICAL GROUP | Facility: CLINIC | Age: 49
End: 2019-01-29
Payer: MEDICAID

## 2019-01-29 VITALS
OXYGEN SATURATION: 98 % | DIASTOLIC BLOOD PRESSURE: 84 MMHG | TEMPERATURE: 98.8 F | RESPIRATION RATE: 14 BRPM | HEIGHT: 60 IN | HEART RATE: 76 BPM | WEIGHT: 176.8 LBS | SYSTOLIC BLOOD PRESSURE: 130 MMHG | BODY MASS INDEX: 34.71 KG/M2

## 2019-01-29 DIAGNOSIS — Z12.31 VISIT FOR SCREENING MAMMOGRAM: ICD-10-CM

## 2019-01-29 DIAGNOSIS — E78.5 DYSLIPIDEMIA: ICD-10-CM

## 2019-01-29 DIAGNOSIS — Z79.1 NSAID LONG-TERM USE: ICD-10-CM

## 2019-01-29 DIAGNOSIS — E55.9 VITAMIN D DEFICIENCY: ICD-10-CM

## 2019-01-29 DIAGNOSIS — G89.29 CHRONIC THORACIC BACK PAIN, UNSPECIFIED BACK PAIN LATERALITY: ICD-10-CM

## 2019-01-29 DIAGNOSIS — Z00.00 ENCOUNTER FOR MEDICAL EXAMINATION TO ESTABLISH CARE: ICD-10-CM

## 2019-01-29 DIAGNOSIS — E66.09 CLASS 1 OBESITY DUE TO EXCESS CALORIES WITHOUT SERIOUS COMORBIDITY WITH BODY MASS INDEX (BMI) OF 34.0 TO 34.9 IN ADULT: ICD-10-CM

## 2019-01-29 DIAGNOSIS — M54.6 CHRONIC THORACIC BACK PAIN, UNSPECIFIED BACK PAIN LATERALITY: ICD-10-CM

## 2019-01-29 DIAGNOSIS — L30.9 ECZEMA, UNSPECIFIED TYPE: ICD-10-CM

## 2019-01-29 DIAGNOSIS — R73.9 ELEVATED BLOOD SUGAR: ICD-10-CM

## 2019-01-29 PROBLEM — Z01.419 WELL WOMAN EXAM: Status: RESOLVED | Noted: 2017-05-19 | Resolved: 2019-01-29

## 2019-01-29 PROCEDURE — 99214 OFFICE O/P EST MOD 30 MIN: CPT | Performed by: PHYSICIAN ASSISTANT

## 2019-01-29 RX ORDER — IBUPROFEN 800 MG/1
800 TABLET ORAL EVERY 8 HOURS PRN
Qty: 90 TAB | Refills: 0 | Status: SHIPPED | OUTPATIENT
Start: 2019-01-29 | End: 2019-02-12

## 2019-01-29 RX ORDER — TRIAMCINOLONE ACETONIDE 5 MG/G
CREAM TOPICAL
Qty: 60 G | Refills: 0 | Status: SHIPPED | OUTPATIENT
Start: 2019-01-29 | End: 2019-02-12

## 2019-01-29 ASSESSMENT — PATIENT HEALTH QUESTIONNAIRE - PHQ9: CLINICAL INTERPRETATION OF PHQ2 SCORE: 0

## 2019-01-29 NOTE — ASSESSMENT & PLAN NOTE
Patient has been using 800mg ibuprofen more than 3 times per week for the past couple of years. She does have diarrhea associated with use if used daily.

## 2019-01-29 NOTE — ASSESSMENT & PLAN NOTE
This patient's primary care provider recently retired so the patient is seeking to establish care with a new provider today.

## 2019-01-29 NOTE — PROGRESS NOTES
Chief Complaint   Patient presents with   • Establish Care       HISTORY OF THE PRESENT ILLNESS: This is a 48 y.o. female new patient to our practice who presents today for evaluation and management of:    Class 1 obesity due to excess calories without serious comorbidity with body mass index (BMI) of 34.0 to 34.9 in adult  Patient has maintained same weight for at least the past few months.     Dyslipidemia  No recent labs. Overdue. Does not take medication for this.     Elevated blood sugar  No recent A1C on file. Most recent was 6.0% in 2014 without follow up regarding this issue. Does have family history of diabetes.     Thoracic back pain  Patient has chronic pain to mid and lower back. The pain is worsened by  Activity and seems to be radiating to her left lateral hip.  Patient has not attended PT or done stretches in the past. She does take ibuprofen frequently for this and headache. . She denies bowel or bladder incontinence.  She has been offered physical therapy multiple times and has never completed this.    NSAID long-term use  Patient has been using 800mg ibuprofen more than 3 times per week for the past couple of years. She does have diarrhea associated with use if used daily.     Encounter for medical examination to establish care  This patient's primary care provider recently retired so the patient is seeking to establish care with a new provider today.       Eczema  This is a chronic condition, new to this provider worst on the patient's abdomen and bilateral elbows well controlled on occasional triamcinolone 0.5% cream.  Patient is requesting medication refills at this time.    Vitamin D deficiency  No recent labs on file. Overdue.       Past Medical History:   Diagnosis Date   • Anemia    • Chronic headaches 4/30/2014    X years Since a kid Motrin helps - only thing that helps Would like rx   • Hyperlipidemia    • Thyroid nodule    • Urinary tract infection, site not specified        History  reviewed. No pertinent surgical history.    Family Status   Relation Status   • MGMo (Not Specified)   • MGFa (Not Specified)   • PGMo (Not Specified)   • Mo Alive   • Fa Alive   • Child Alive     Family History   Problem Relation Age of Onset   • Cancer Maternal Grandmother 56        pancreatic   • Heart Disease Maternal Grandfather 40        MI   • Diabetes Paternal Grandmother    • Diabetes Mother    • Stroke Mother    • Heart Disease Mother    • Stroke Father         possibly not a stroke? possibly TIA?    • Depression Child        Social History   Substance Use Topics   • Smoking status: Never Smoker   • Smokeless tobacco: Never Used   • Alcohol use No       Allergies: Pcn [penicillins]    Current Outpatient Prescriptions Ordered in [facility]   Medication Sig Dispense Refill   • ibuprofen (MOTRIN) 800 MG Tab Take 1 Tab by mouth every 8 hours as needed. AS NEEDED FOR MILD PAIN 90 Tab 0   • triamcinolone acetonide (KENALOG) 0.5 % Cream Apply to affected area up to 3 times a day. 60 g 0   • albuterol (VENTOLIN OR PROVENTIL) 108 (90 BASE) MCG/ACT Aero Soln inhalation aerosol Inhale 2 Puffs by mouth every 6 hours as needed for Shortness of Breath. 8.5 g 3     No current [facility]-ordered facility-administered medications on file.      Review of Systems: See HPI above.   Constitutional: Negative for fever, chills, unplanned weight change and malaise/fatigue.   HENT: Negative for ear pain or tinnitus, nosebleeds, congestion, sore throat and neck pain.    Eyes: Negative for blurred or decreased vision.   Respiratory: Negative for cough, sputum production, shortness of breath and wheezing.    Cardiovascular: Negative for chest pain, palpitations, orthopnea, syncope and leg swelling.   Gastrointestinal: Negative for heartburn, nausea, vomiting and abdominal pain.   Genitourinary: Negative for dysuria, urgency and frequency.   Musculoskeletal: Negative for myalgias. Positive for low back pain and hip and left foot pain.   Skin:  Negative for rash, itching, nail changes or skin changes.   Neurological: Negative for dizziness, tremors, sensory change, focal weakness, tingling and headaches.   Endo/Heme/Allergies: Does not bruise/bleed easily.    Psychiatric/Behavioral: Negative for depression, suicidal ideas and memory loss. The patient is not nervous/anxious and does not have insomnia.  Pt does not use recreational drugs or excessive alcohol.       Exam:  Blood pressure 130/84, pulse 76, temperature 37.1 °C (98.8 °F), resp. rate 14, height 1.524 m (5'), weight 80.2 kg (176 lb 12.8 oz), SpO2 98 %, not currently breastfeeding.  Body mass index is 34.53 kg/m².  General:  Obese female in NAD  Eyes: Conjunctiva clear, lids without ptosis, pupils equal and reactive to light accommodation.  ENMT: Nose and lips without deformity. Nasal mucosa and septum pink without evidence of purulent drainage.  Neck: Thyroid is not visibly enlarged.  Pulmonary: Normal effort. No rales, ronchi, or wheezing upon auscultation.   Cardiovascular: Regular rate and rhythm without murmur. Carotid and radial pulses are intact and equal bilaterally.   Extremities: No clubbing or cyanosis. Bilateral upper and lower extremities without edema.   Skin: Warm and dry.  About 8 Skin-colored solid nodules present on the left extensor surface of elbow and erythematous scaling plaque present at right extensor elbow  Musculoskeletal: Normal gait.   Psych: Normal mood and affect. Alert and oriented x3. Judgment and insight is normal.      Medical Decision Making & Discussion:   1. Chronic thoracic back pain, unspecified back pain laterality  Advised regarding figure 4 stretch today which she states is causing a stretching sensation in-clinic today and does seem to help.   - ibuprofen (MOTRIN) 800 MG Tab; Take 1 Tab by mouth every 8 hours as needed. AS NEEDED FOR MILD PAIN  Dispense: 90 Tab; Refill: 0    2. Eczema, unspecified type    - triamcinolone acetonide (KENALOG) 0.5 % Cream;  Apply to affected area up to 3 times a day.  Dispense: 60 g; Refill: 0  - COMP METABOLIC PANEL; Future    3. Vitamin D deficiency  - VITAMIN D, 1,25 + 25-HYDROXY    4. Dyslipidemia  - COMP METABOLIC PANEL; Future  - Lipid Profile; Future    5. Elevated blood sugar  - COMP METABOLIC PANEL; Future  - Lipid Profile; Future  - HEMOGLOBIN A1C; Future    6. Class 1 obesity due to excess calories without serious comorbidity with body mass index (BMI) of 34.0 to 34.9 in adult  - COMP METABOLIC PANEL; Future  - Lipid Profile; Future    7. NSAID long-term use  - COMP METABOLIC PANEL; Future  - ESTIMATED GFR; Future    8. Encounter for medical examination to establish care  I am happy to participate in the care of this pleasant 48 year old Woman.       9. Visit for screening mammogram    - MA-SCREENING MAMMO BILAT W/TOMOSYNTHESIS W/CAD; Future        Please note that this dictation was created using voice recognition software. I have made every reasonable attempt to correct obvious errors, but I expect that there are errors of grammar and possibly content that I did not discover before finalizing the note.    Return for lab follow up pending abnormal results. .

## 2019-01-29 NOTE — ASSESSMENT & PLAN NOTE
Patient has chronic pain to mid and lower back. The pain is worsened by  Activity and seems to be radiating to her left lateral hip.  Patient has not attended PT or done stretches in the past. She does take ibuprofen frequently for this and headache. . She denies bowel or bladder incontinence.  She has been offered physical therapy multiple times and has never completed this.

## 2019-01-29 NOTE — ASSESSMENT & PLAN NOTE
No recent A1C on file. Most recent was 6.0% in 2014 without follow up regarding this issue. Does have family history of diabetes.

## 2019-01-29 NOTE — ASSESSMENT & PLAN NOTE
This is a chronic condition, new to this provider worst on the patient's abdomen and bilateral elbows well controlled on occasional triamcinolone 0.5% cream.  Patient is requesting medication refills at this time.

## 2019-02-04 ENCOUNTER — HOSPITAL ENCOUNTER (OUTPATIENT)
Dept: LAB | Facility: MEDICAL CENTER | Age: 49
End: 2019-02-04
Attending: PHYSICIAN ASSISTANT
Payer: MEDICAID

## 2019-02-04 DIAGNOSIS — Z79.1 NSAID LONG-TERM USE: ICD-10-CM

## 2019-02-04 DIAGNOSIS — E78.5 DYSLIPIDEMIA: ICD-10-CM

## 2019-02-04 DIAGNOSIS — R73.9 ELEVATED BLOOD SUGAR: ICD-10-CM

## 2019-02-04 DIAGNOSIS — E66.09 CLASS 1 OBESITY DUE TO EXCESS CALORIES WITHOUT SERIOUS COMORBIDITY WITH BODY MASS INDEX (BMI) OF 34.0 TO 34.9 IN ADULT: ICD-10-CM

## 2019-02-04 DIAGNOSIS — L30.9 ECZEMA, UNSPECIFIED TYPE: ICD-10-CM

## 2019-02-04 LAB
25(OH)D3 SERPL-MCNC: 17 NG/ML (ref 30–100)
ALBUMIN SERPL BCP-MCNC: 4.2 G/DL (ref 3.2–4.9)
ALBUMIN/GLOB SERPL: 1.2 G/DL
ALP SERPL-CCNC: 94 U/L (ref 30–99)
ALT SERPL-CCNC: 10 U/L (ref 2–50)
ANION GAP SERPL CALC-SCNC: 8 MMOL/L (ref 0–11.9)
AST SERPL-CCNC: 17 U/L (ref 12–45)
BILIRUB SERPL-MCNC: 0.3 MG/DL (ref 0.1–1.5)
BUN SERPL-MCNC: 11 MG/DL (ref 8–22)
CALCIUM SERPL-MCNC: 9.1 MG/DL (ref 8.5–10.5)
CHLORIDE SERPL-SCNC: 105 MMOL/L (ref 96–112)
CHOLEST SERPL-MCNC: 170 MG/DL (ref 100–199)
CO2 SERPL-SCNC: 24 MMOL/L (ref 20–33)
CREAT SERPL-MCNC: 0.74 MG/DL (ref 0.5–1.4)
EST. AVERAGE GLUCOSE BLD GHB EST-MCNC: 120 MG/DL
FASTING STATUS PATIENT QL REPORTED: NORMAL
GLOBULIN SER CALC-MCNC: 3.6 G/DL (ref 1.9–3.5)
GLUCOSE SERPL-MCNC: 98 MG/DL (ref 65–99)
HBA1C MFR BLD: 5.8 % (ref 0–5.6)
HDLC SERPL-MCNC: 47 MG/DL
LDLC SERPL CALC-MCNC: 102 MG/DL
POTASSIUM SERPL-SCNC: 4.1 MMOL/L (ref 3.6–5.5)
PROT SERPL-MCNC: 7.8 G/DL (ref 6–8.2)
SODIUM SERPL-SCNC: 137 MMOL/L (ref 135–145)
TRIGL SERPL-MCNC: 105 MG/DL (ref 0–149)

## 2019-02-04 PROCEDURE — 80053 COMPREHEN METABOLIC PANEL: CPT

## 2019-02-04 PROCEDURE — 83036 HEMOGLOBIN GLYCOSYLATED A1C: CPT

## 2019-02-04 PROCEDURE — 80061 LIPID PANEL: CPT

## 2019-02-04 PROCEDURE — 82306 VITAMIN D 25 HYDROXY: CPT

## 2019-02-04 PROCEDURE — 36415 COLL VENOUS BLD VENIPUNCTURE: CPT

## 2019-02-04 PROCEDURE — 82652 VIT D 1 25-DIHYDROXY: CPT

## 2019-02-06 LAB — 1,25(OH)2D3 SERPL-MCNC: 47 PG/ML (ref 19.9–79.3)

## 2019-02-07 ENCOUNTER — TELEPHONE (OUTPATIENT)
Dept: MEDICAL GROUP | Facility: CLINIC | Age: 49
End: 2019-02-07

## 2019-02-07 NOTE — TELEPHONE ENCOUNTER
----- Message from Tiesha Rizo P.A.-C. sent at 2/5/2019  9:55 AM PST -----  I reviewed labs. While slightly reduced from 2014, blood sugar remains elevated and should be discussed. Vitamin D levels are also quite low and should be supplemented with 5000 IU Vit D daily. Otherwise, everything looks good. Return for follow up as in the next couple of weeks to discuss blood sugars.

## 2019-02-07 NOTE — TELEPHONE ENCOUNTER
Phone Number Called: [phone] (home)     Message: Left VM to Schedule Appointment to go over Labs    Left Message for patient to call back: yes

## 2019-02-12 ENCOUNTER — OFFICE VISIT (OUTPATIENT)
Dept: MEDICAL GROUP | Facility: CLINIC | Age: 49
End: 2019-02-12
Payer: MEDICAID

## 2019-02-12 VITALS
SYSTOLIC BLOOD PRESSURE: 120 MMHG | WEIGHT: 177 LBS | HEIGHT: 60 IN | RESPIRATION RATE: 16 BRPM | HEART RATE: 75 BPM | OXYGEN SATURATION: 98 % | DIASTOLIC BLOOD PRESSURE: 78 MMHG | TEMPERATURE: 98.1 F | BODY MASS INDEX: 34.75 KG/M2

## 2019-02-12 DIAGNOSIS — R73.03 PREDIABETES: ICD-10-CM

## 2019-02-12 DIAGNOSIS — E78.5 DYSLIPIDEMIA: ICD-10-CM

## 2019-02-12 DIAGNOSIS — E55.9 VITAMIN D DEFICIENCY: ICD-10-CM

## 2019-02-12 PROCEDURE — 99213 OFFICE O/P EST LOW 20 MIN: CPT | Performed by: PHYSICIAN ASSISTANT

## 2019-02-13 PROBLEM — Z00.00 ENCOUNTER FOR MEDICAL EXAMINATION TO ESTABLISH CARE: Status: RESOLVED | Noted: 2019-01-29 | Resolved: 2019-02-13

## 2019-02-13 NOTE — PROGRESS NOTES
"Chief Complaint   Patient presents with   • Vitamin D Deficiency     [facility] labs        HISTORY OF PRESENT ILLNESS: Patient is a 48 y.o. female established patient who presents today for evaluation and management of:    Prediabetes  Most recent hemoglobin A1c was 5.8%.  Patient does have family history of diabetes and states she does have a \"junk food\" diet without exercise.  She states she does feel better when she exercises and wishes to attempt lifestyle modifications rather than medication at this time.    Dyslipidemia  Component      Latest Ref Rng & Units 2/4/2019           7:06 AM   Cholesterol,Tot      100 - 199 mg/dL 170   Triglycerides      0 - 149 mg/dL 105   HDL      >=40 mg/dL 47   LDL      <100 mg/dL 102 (H)   Most recent labs as above.  Patient's cholesterol seems relatively well controlled at this time.  Improve diet and exercise will help with this.  She denies chest pain, blurry or double vision.    Vitamin D deficiency  Component      Latest Ref Rng & Units 2/4/2019           7:06 AM   25-Hydroxy   Vitamin D 25      30 - 100 ng/mL 17 (L)   Most recent labs as above.  Patient does not take any vitamin D supplements and does not spend much time outdoors.  She does have some lethargy and joint ache.       Patient Active Problem List    Diagnosis Date Noted   • NSAID long-term use 01/29/2019   • Eczema 01/29/2019   • Excessive daytime sleepiness 05/19/2017   • Thyroid nodule 05/19/2017   • Class 1 obesity due to excess calories without serious comorbidity with body mass index (BMI) of 34.0 to 34.9 in adult 07/07/2015   • Prediabetes 12/30/2014   • Thoracic back pain 12/22/2014   • Vitamin D deficiency 05/10/2014   • Dyslipidemia 05/10/2014   • Chronic headaches 04/30/2014       Allergies:Pcn [penicillins]    Current Outpatient Prescriptions   Medication Sig Dispense Refill   • albuterol (VENTOLIN OR PROVENTIL) 108 (90 BASE) MCG/ACT Aero Soln inhalation aerosol Inhale 2 Puffs by mouth every 6 hours as " needed for Shortness of Breath. 8.5 g 3     No current facility-administered medications for this visit.        Social History   Substance Use Topics   • Smoking status: Never Smoker   • Smokeless tobacco: Never Used   • Alcohol use No       Family Status   Relation Status   • MGMo (Not Specified)   • MGFa (Not Specified)   • PGMo (Not Specified)   • Mo Alive   • Fa Alive   • Child Alive     Family History   Problem Relation Age of Onset   • Cancer Maternal Grandmother 56        pancreatic   • Heart Disease Maternal Grandfather 40        MI   • Diabetes Paternal Grandmother    • Diabetes Mother    • Stroke Mother    • Heart Disease Mother    • Stroke Father         possibly not a stroke? possibly TIA?    • Depression Child        Review of Systems: See HPI above.   Constitutional: Negative for fever, chills, weight loss and malaise.  Positive for some fatigue  HENT: Negative for ear pain, nosebleeds, congestion, sore throat and neck pain.    Eyes: Negative for blurred vision.   Respiratory: Negative for shortness of breath, cough, sputum production and wheezing.    Cardiovascular: Negative for chest pain, palpitations, orthopnea and leg swelling.   Gastrointestinal: Negative for heartburn, nausea, vomiting and abdominal pain.   Genitourinary: Negative for dysuria, urgency and frequency.   Musculoskeletal: Positive for knee and low back pain.  Negative for myalgias  Skin: Negative for rash and itching.   Neurological: Negative for dizziness, tingling, tremors, sensory change, focal weakness and headaches.   Endo/Heme/Allergies: Does not bruise/bleed easily.   Psychiatric/Behavioral: Negative for depression, suicidal ideas and memory loss.  The patient is not nervous/anxious and does not have insomnia.      Exam:  Blood pressure 120/78, pulse 75, temperature 36.7 °C (98.1 °F), temperature source Temporal, resp. rate 16, height 1.524 m (5'), weight 80.3 kg (177 lb), SpO2 98 %, not currently breastfeeding.  Body mass  index is 34.57 kg/m².  General:  Obese female in NAD  Head: is grossly normal.  Neck: Supple without masses. Thyroid is not visibly enlarged.  Pulmonary: Clear to ausculation. Normal effort. No rales, ronchi, or wheezing.  Cardiovascular: Regular rate and rhythm without murmur. Carotid pulses are intact and equal bilaterally.  Extremities: no clubbing, cyanosis, or edema.    Medical decision-making and discussion:  1. Prediabetes  Diet and lifestyle modifications discussed at length today.  Patient wishes not to start medication at this time.     2. Dyslipidemia  Diet and exercise discussed today.    3. Vitamin D deficiency  Patient advised to begin taking 5000 units of vitamin D on a daily basis and recheck in the next 6 months.      Please note that this dictation was created using voice recognition software. I have made every reasonable attempt to correct obvious errors, but I expect that there are errors of grammar and possibly content that I did not discover before finalizing the note.      Return in about 6 weeks (around 3/26/2019) for blood sugar.

## 2019-02-13 NOTE — ASSESSMENT & PLAN NOTE
"Most recent hemoglobin A1c was 5.8%.  Patient does have family history of diabetes and states she does have a \"junk food\" diet without exercise.  She states she does feel better when she exercises and wishes to attempt lifestyle modifications rather than medication at this time.  "

## 2019-02-13 NOTE — ASSESSMENT & PLAN NOTE
Component      Latest Ref Rng & Units 2/4/2019           7:06 AM   25-Hydroxy   Vitamin D 25      30 - 100 ng/mL 17 (L)   Most recent labs as above.  Patient does not take any vitamin D supplements and does not spend much time outdoors.  She does have some lethargy and joint ache.

## 2019-02-13 NOTE — ASSESSMENT & PLAN NOTE
Component      Latest Ref Rng & Units 2/4/2019           7:06 AM   Cholesterol,Tot      100 - 199 mg/dL 170   Triglycerides      0 - 149 mg/dL 105   HDL      >=40 mg/dL 47   LDL      <100 mg/dL 102 (H)   Most recent labs as above.  Patient's cholesterol seems relatively well controlled at this time.  Improve diet and exercise will help with this.  She denies chest pain, blurry or double vision.

## 2019-03-04 DIAGNOSIS — M54.6 CHRONIC THORACIC BACK PAIN, UNSPECIFIED BACK PAIN LATERALITY: ICD-10-CM

## 2019-03-04 DIAGNOSIS — L30.9 ECZEMA, UNSPECIFIED TYPE: ICD-10-CM

## 2019-03-04 DIAGNOSIS — G89.29 CHRONIC THORACIC BACK PAIN, UNSPECIFIED BACK PAIN LATERALITY: ICD-10-CM

## 2019-03-04 RX ORDER — TRIAMCINOLONE ACETONIDE 5 MG/G
CREAM TOPICAL
Qty: 60 G | Refills: 0 | Status: SHIPPED | OUTPATIENT
Start: 2019-03-04 | End: 2019-05-25 | Stop reason: SDUPTHER

## 2019-03-04 RX ORDER — IBUPROFEN 800 MG/1
800 TABLET ORAL EVERY 8 HOURS PRN
Qty: 90 TAB | Refills: 0 | Status: SHIPPED | OUTPATIENT
Start: 2019-03-04 | End: 2019-05-25 | Stop reason: SDUPTHER

## 2019-05-29 ENCOUNTER — HOSPITAL ENCOUNTER (OUTPATIENT)
Dept: RADIOLOGY | Facility: MEDICAL CENTER | Age: 49
End: 2019-05-29
Attending: PHYSICIAN ASSISTANT
Payer: MEDICAID

## 2019-05-29 DIAGNOSIS — Z12.31 VISIT FOR SCREENING MAMMOGRAM: ICD-10-CM

## 2019-05-29 PROCEDURE — 77063 BREAST TOMOSYNTHESIS BI: CPT

## 2019-09-12 ENCOUNTER — NON-PROVIDER VISIT (OUTPATIENT)
Dept: URGENT CARE | Facility: PHYSICIAN GROUP | Age: 49
End: 2019-09-12

## 2019-09-12 DIAGNOSIS — Z02.1 PRE-EMPLOYMENT DRUG SCREENING: ICD-10-CM

## 2019-09-12 LAB
AMP AMPHETAMINE: NORMAL
COC COCAINE: NORMAL
INT CON NEG: NORMAL
INT CON POS: NORMAL
MET METHAMPHETAMINES: NORMAL
OPI OPIATES: NORMAL
PCP PHENCYCLIDINE: NORMAL
POC DRUG COMMENT 753798-OCCUPATIONAL HEALTH: NORMAL
THC: NORMAL

## 2019-09-12 PROCEDURE — 80305 DRUG TEST PRSMV DIR OPT OBS: CPT | Performed by: NURSE PRACTITIONER

## 2020-08-27 ENCOUNTER — OFFICE VISIT (OUTPATIENT)
Dept: URGENT CARE | Facility: PHYSICIAN GROUP | Age: 50
End: 2020-08-27
Payer: COMMERCIAL

## 2020-08-27 ENCOUNTER — HOSPITAL ENCOUNTER (OUTPATIENT)
Facility: MEDICAL CENTER | Age: 50
End: 2020-08-27
Attending: NURSE PRACTITIONER
Payer: COMMERCIAL

## 2020-08-27 VITALS
RESPIRATION RATE: 16 BRPM | WEIGHT: 177 LBS | DIASTOLIC BLOOD PRESSURE: 74 MMHG | SYSTOLIC BLOOD PRESSURE: 142 MMHG | OXYGEN SATURATION: 99 % | BODY MASS INDEX: 34.57 KG/M2 | TEMPERATURE: 97.8 F | HEART RATE: 70 BPM

## 2020-08-27 DIAGNOSIS — R30.0 DYSURIA: ICD-10-CM

## 2020-08-27 DIAGNOSIS — N76.0 ACUTE VAGINITIS: ICD-10-CM

## 2020-08-27 LAB
APPEARANCE UR: NORMAL
BILIRUB UR STRIP-MCNC: NORMAL MG/DL
COLOR UR AUTO: NORMAL
GLUCOSE UR STRIP.AUTO-MCNC: NORMAL MG/DL
KETONES UR STRIP.AUTO-MCNC: NORMAL MG/DL
LEUKOCYTE ESTERASE UR QL STRIP.AUTO: NORMAL
NITRITE UR QL STRIP.AUTO: NORMAL
PH UR STRIP.AUTO: 5 [PH] (ref 5–8)
PROT UR QL STRIP: NORMAL MG/DL
RBC UR QL AUTO: NORMAL
SP GR UR STRIP.AUTO: 1.03
UROBILINOGEN UR STRIP-MCNC: 0.2 MG/DL

## 2020-08-27 PROCEDURE — 87591 N.GONORRHOEAE DNA AMP PROB: CPT

## 2020-08-27 PROCEDURE — 81002 URINALYSIS NONAUTO W/O SCOPE: CPT | Performed by: NURSE PRACTITIONER

## 2020-08-27 PROCEDURE — 87480 CANDIDA DNA DIR PROBE: CPT

## 2020-08-27 PROCEDURE — 87660 TRICHOMONAS VAGIN DIR PROBE: CPT

## 2020-08-27 PROCEDURE — 87086 URINE CULTURE/COLONY COUNT: CPT

## 2020-08-27 PROCEDURE — 87510 GARDNER VAG DNA DIR PROBE: CPT

## 2020-08-27 PROCEDURE — 99213 OFFICE O/P EST LOW 20 MIN: CPT | Performed by: NURSE PRACTITIONER

## 2020-08-27 PROCEDURE — 87491 CHLMYD TRACH DNA AMP PROBE: CPT

## 2020-08-27 ASSESSMENT — ENCOUNTER SYMPTOMS
FLANK PAIN: 0
CHILLS: 0
FEVER: 0
MYALGIAS: 0
FATIGUE: 0
ABDOMINAL PAIN: 0
EYE REDNESS: 0
VOMITING: 0
NAUSEA: 0
HEADACHES: 0
DIZZINESS: 0
SHORTNESS OF BREATH: 0
SORE THROAT: 0

## 2020-08-27 NOTE — LETTER
August 27, 2020         Patient: Nanda Gutierrez   YOB: 1970   Date of Visit: 8/27/2020           To Whom it May Concern:    Nanda Gutierrez was seen in my clinic on 8/27/2020. She may return to work on 8/28/2020.    If you have any questions or concerns, please don't hesitate to call.        Sincerely,           HARJEET Hernandez  Electronically Signed

## 2020-08-27 NOTE — PROGRESS NOTES
Subjective:   Nanda Gutierrez  is a 49 y.o. female who presents for Vaginal Itching (and burning x1d )        Vaginal Itching  This is a new problem. The current episode started yesterday (painful intercouse; spotting ). The problem occurs constantly. The problem has been gradually worsening. Associated symptoms include urinary symptoms. Pertinent negatives include no abdominal pain, chest pain, chills, fatigue, fever, headaches, myalgias, nausea, rash, sore throat or vomiting. Nothing aggravates the symptoms. She has tried nothing for the symptoms. The treatment provided no relief.     Review of Systems   Constitutional: Negative for chills, fatigue and fever.   HENT: Negative for sore throat.    Eyes: Negative for redness.   Respiratory: Negative for shortness of breath.    Cardiovascular: Negative for chest pain.   Gastrointestinal: Negative for abdominal pain, nausea and vomiting.   Genitourinary: Positive for dysuria. Negative for flank pain, frequency, hematuria and urgency.        Vaginal itching     Musculoskeletal: Negative for myalgias.   Skin: Negative for rash.   Neurological: Negative for dizziness and headaches.     Allergies   Allergen Reactions   • Pcn [Penicillins]      Childhood reaction      Objective:   /74   Pulse 70   Temp 36.6 °C (97.8 °F) (Temporal)   Resp 16   Wt 80.3 kg (177 lb)   SpO2 99%   BMI 34.57 kg/m²   Physical Exam  Vitals signs and nursing note reviewed.   Constitutional:       General: She is not in acute distress.     Appearance: She is well-developed.   HENT:      Head: Normocephalic and atraumatic.      Right Ear: External ear normal.      Left Ear: External ear normal.      Nose: Nose normal.      Mouth/Throat:      Mouth: Mucous membranes are moist.   Eyes:      Conjunctiva/sclera: Conjunctivae normal.   Cardiovascular:      Rate and Rhythm: Normal rate.   Pulmonary:      Effort: Pulmonary effort is normal. No respiratory distress.      Breath sounds: Normal breath  sounds.   Abdominal:      General: Bowel sounds are normal. There is no distension.      Tenderness: There is no right CVA tenderness or left CVA tenderness.   Genitourinary:     Comments: Deferred exam    Musculoskeletal: Normal range of motion.   Skin:     General: Skin is warm and dry.   Neurological:      General: No focal deficit present.      Mental Status: She is alert and oriented to person, place, and time. Mental status is at baseline.      Gait: Gait (gait at baseline) normal.   Psychiatric:         Judgment: Judgment normal.           Assessment/Plan:   1. Acute vaginitis  - CHLAMYDIA/GC PCR URINE OR SWAB; Future  - VAGINAL PATHOGENS DNA PANEL; Future  - POCT Urinalysis  - URINE CULTURE(NEW); Future    2. Dysuria  - CHLAMYDIA/GC PCR URINE OR SWAB; Future  - VAGINAL PATHOGENS DNA PANEL; Future  - POCT Urinalysis  - URINE CULTURE(NEW); Future    Results for orders placed or performed in visit on 08/27/20   POCT Urinalysis   Result Value Ref Range    POC Color dark yellow Negative    POC Appearance slightly cloudy Negative    POC Leukocyte Esterase Neg Negative    POC Nitrites Neg Negative    POC Urobiligen 0.2 Negative (0.2) mg/dL    POC Protein Neg Negative mg/dL    POC Urine PH 5.0 5.0 - 8.0    POC Blood Small Negative    POC Specific Gravity 1.030 <1.005 - >1.030    POC Ketones Neg Negative mg/dL    POC Bilirubin Neg Negative mg/dL    POC Glucose Neg Negative mg/dL     Patient is a 49-year-old female present with stated above, patient still complaining vaginal itching differentials include but not limited to yeast infection, bacterial vaginosis, possible STI.  Patient verbalizing that she would not like to start any medication until confirmative labs.  We will follow-up with pending lab results and treat as indicated.  Did encourage patient she may trial over-the-counter Monistat.  Differential diagnosis, natural history, supportive care, and indications for immediate follow-up discussed.

## 2020-08-28 LAB
C TRACH DNA SPEC QL NAA+PROBE: NEGATIVE
CANDIDA DNA VAG QL PROBE+SIG AMP: NEGATIVE
G VAGINALIS DNA VAG QL PROBE+SIG AMP: POSITIVE
N GONORRHOEA DNA SPEC QL NAA+PROBE: NEGATIVE
SPECIMEN SOURCE: NORMAL
T VAGINALIS DNA VAG QL PROBE+SIG AMP: NEGATIVE

## 2020-08-29 LAB
BACTERIA UR CULT: NORMAL
SIGNIFICANT IND 70042: NORMAL
SITE SITE: NORMAL
SOURCE SOURCE: NORMAL

## 2020-08-30 ENCOUNTER — TELEPHONE (OUTPATIENT)
Dept: URGENT CARE | Facility: PHYSICIAN GROUP | Age: 50
End: 2020-08-30

## 2020-08-30 DIAGNOSIS — N76.0 BV (BACTERIAL VAGINOSIS): ICD-10-CM

## 2020-08-30 DIAGNOSIS — B96.89 BV (BACTERIAL VAGINOSIS): ICD-10-CM

## 2020-08-30 RX ORDER — METRONIDAZOLE 500 MG/1
500 TABLET ORAL 2 TIMES DAILY
Qty: 14 TAB | Refills: 0 | Status: SHIPPED | OUTPATIENT
Start: 2020-08-30 | End: 2020-09-06

## 2021-04-22 ENCOUNTER — OFFICE VISIT (OUTPATIENT)
Dept: URGENT CARE | Facility: CLINIC | Age: 51
End: 2021-04-22
Payer: COMMERCIAL

## 2021-04-22 VITALS
DIASTOLIC BLOOD PRESSURE: 82 MMHG | HEIGHT: 60 IN | WEIGHT: 185.6 LBS | OXYGEN SATURATION: 99 % | TEMPERATURE: 98 F | HEART RATE: 89 BPM | RESPIRATION RATE: 16 BRPM | SYSTOLIC BLOOD PRESSURE: 122 MMHG | BODY MASS INDEX: 36.44 KG/M2

## 2021-04-22 DIAGNOSIS — G56.01 CARPAL TUNNEL SYNDROME OF RIGHT WRIST: ICD-10-CM

## 2021-04-22 DIAGNOSIS — M25.531 RIGHT WRIST PAIN: ICD-10-CM

## 2021-04-22 PROCEDURE — 99214 OFFICE O/P EST MOD 30 MIN: CPT | Performed by: NURSE PRACTITIONER

## 2021-04-22 ASSESSMENT — ENCOUNTER SYMPTOMS
MYALGIAS: 0
VOMITING: 0
TINGLING: 1
SHORTNESS OF BREATH: 0
FEVER: 0
CHILLS: 0
MUSCLE WEAKNESS: 0
NUMBNESS: 1
EYE REDNESS: 0
DIZZINESS: 0
NAUSEA: 0
SORE THROAT: 0

## 2021-04-23 NOTE — PROGRESS NOTES
Subjective:   Nanda Gutierrez is a 50 y.o. female who presents for Wrist Pain ((R), x5 days )      Wrist Injury   The incident occurred 5 to 7 days ago. The incident occurred at home. The injury mechanism was repetitive motion. The pain is present in the right wrist. The quality of the pain is described as shooting. The pain radiates to the right arm. The pain is at a severity of 9/10. The pain is moderate. The pain has been constant since the incident. Associated symptoms include numbness and tingling. Pertinent negatives include no chest pain or muscle weakness. The symptoms are aggravated by palpation. She has tried nothing for the symptoms. The treatment provided no relief.       Review of Systems   Constitutional: Negative for chills and fever.   HENT: Negative for sore throat.    Eyes: Negative for redness.   Respiratory: Negative for shortness of breath.    Cardiovascular: Negative for chest pain.   Gastrointestinal: Negative for nausea and vomiting.   Genitourinary: Negative for dysuria.   Musculoskeletal: Positive for joint pain. Negative for myalgias.   Skin: Negative for rash.   Neurological: Positive for tingling and numbness. Negative for dizziness.       Medications:    • albuterol Aers  • ibuprofen Tabs  • triamcinolone acetonide Crea    Allergies: Pcn [penicillins]    Problem List: Nanda Gutierrez has Chronic headaches; Vitamin D deficiency; Dyslipidemia; Thoracic back pain; Prediabetes; Class 1 obesity due to excess calories without serious comorbidity with body mass index (BMI) of 34.0 to 34.9 in adult; Excessive daytime sleepiness; Thyroid nodule; NSAID long-term use; and Eczema on their problem list.    Surgical History:  No past surgical history on file.    Past Social Hx: Nanda Gutierrez  reports that she has never smoked. She has never used smokeless tobacco. She reports that she does not drink alcohol and does not use drugs.     Past Family Hx:  Nanda Gutierrez family history includes Cancer (age of onset: 56)  in her maternal grandmother; Depression in her child; Diabetes in her mother and paternal grandmother; Heart Disease in her mother; Heart Disease (age of onset: 40) in her maternal grandfather; Stroke in her father and mother.     Problem list, medications, and allergies reviewed by myself today in Epic.     Objective:     /82 (BP Location: Left arm, Patient Position: Sitting, BP Cuff Size: Adult long)   Pulse 89   Temp 36.7 °C (98 °F) (Temporal)   Resp 16   Ht 1.524 m (5')   Wt 84.2 kg (185 lb 9.6 oz)   SpO2 99%   BMI 36.25 kg/m²     Physical Exam  Constitutional:       Appearance: Normal appearance. She is not ill-appearing or toxic-appearing.   HENT:      Head: Normocephalic.      Right Ear: External ear normal.      Left Ear: External ear normal.      Nose: Nose normal.      Mouth/Throat:      Lips: Pink.      Mouth: Mucous membranes are moist.   Eyes:      General: Lids are normal.         Right eye: No discharge.         Left eye: No discharge.   Pulmonary:      Effort: Pulmonary effort is normal. No accessory muscle usage or respiratory distress.   Musculoskeletal:         General: Normal range of motion.      Right wrist: Tenderness present. No swelling, deformity, snuff box tenderness or crepitus. Normal range of motion. Normal pulse.      Right hand: Tenderness present. No swelling or bony tenderness. Normal range of motion. Decreased sensation of the ulnar distribution and median distribution. There is disruption of two-point discrimination. Normal capillary refill. Normal pulse.      Cervical back: Full passive range of motion without pain.      Comments: Phalen's and Tinel's positive, tenderness to palpation on the ulnar aspect.  No gross deformities,   Skin:     Coloration: Skin is not pale.   Neurological:      Mental Status: She is alert and oriented to person, place, and time.   Psychiatric:         Mood and Affect: Mood normal.         Thought Content: Thought content normal.          Assessment/Plan:     Diagnosis and associated orders:     1. Carpal tunnel syndrome of right wrist  REFERRAL TO HAND SURGERY   2. Right wrist pain          Comments/MDM:     • Patient is a 50-year-old female present with the stated above, no gross deformities noted on exam, patient without acute trauma or injury, physical exam consistent with diagnoses patient was provided a wrist brace advised to wear at night, encouraged ice, may take anti-inflammatories as needed for pain.  Will refer patient to hand surgery for further evaluation and management   • Differential diagnosis, natural history, supportive care, and indications for immediate follow-up discussed.            Please note that this dictation was created using voice recognition software. I have made a reasonable attempt to correct obvious errors, but I expect that there are errors of grammar and possibly content that I did not discover before finalizing the note.    This note was electronically signed by Attila VALERO.

## 2021-05-13 ENCOUNTER — APPOINTMENT (OUTPATIENT)
Dept: RADIOLOGY | Facility: IMAGING CENTER | Age: 51
End: 2021-05-13
Attending: FAMILY MEDICINE
Payer: COMMERCIAL

## 2021-05-13 ENCOUNTER — OFFICE VISIT (OUTPATIENT)
Dept: URGENT CARE | Facility: CLINIC | Age: 51
End: 2021-05-13
Payer: COMMERCIAL

## 2021-05-13 VITALS
WEIGHT: 184 LBS | SYSTOLIC BLOOD PRESSURE: 110 MMHG | HEIGHT: 60 IN | OXYGEN SATURATION: 95 % | RESPIRATION RATE: 16 BRPM | DIASTOLIC BLOOD PRESSURE: 70 MMHG | HEART RATE: 94 BPM | TEMPERATURE: 98.7 F | BODY MASS INDEX: 36.12 KG/M2

## 2021-05-13 DIAGNOSIS — R05.9 COUGH: ICD-10-CM

## 2021-05-13 DIAGNOSIS — J21.9 ACUTE BRONCHIOLITIS WITH BRONCHOSPASM: ICD-10-CM

## 2021-05-13 PROCEDURE — 71046 X-RAY EXAM CHEST 2 VIEWS: CPT | Mod: TC | Performed by: FAMILY MEDICINE

## 2021-05-13 PROCEDURE — 99213 OFFICE O/P EST LOW 20 MIN: CPT | Performed by: FAMILY MEDICINE

## 2021-05-13 RX ORDER — AZITHROMYCIN 250 MG/1
TABLET, FILM COATED ORAL
Qty: 6 TABLET | Refills: 0 | Status: SHIPPED | OUTPATIENT
Start: 2021-05-13 | End: 2021-05-23

## 2021-05-13 RX ORDER — ALBUTEROL SULFATE 90 UG/1
2 AEROSOL, METERED RESPIRATORY (INHALATION) EVERY 4 HOURS PRN
Qty: 1 EACH | Refills: 0 | Status: SHIPPED | OUTPATIENT
Start: 2021-05-13 | End: 2022-10-11

## 2021-05-13 RX ORDER — ALBUTEROL SULFATE 90 UG/1
4 AEROSOL, METERED RESPIRATORY (INHALATION) ONCE
Status: COMPLETED | OUTPATIENT
Start: 2021-05-13 | End: 2021-05-13

## 2021-05-13 RX ORDER — PREDNISONE 20 MG/1
TABLET ORAL
Qty: 10 TABLET | Refills: 0 | Status: SHIPPED | OUTPATIENT
Start: 2021-05-13 | End: 2021-05-23

## 2021-05-13 RX ORDER — INHALER,ASSIST DEVICE,MED MASK
1 SPACER (EA) MISCELLANEOUS ONCE
Status: COMPLETED | OUTPATIENT
Start: 2021-05-13 | End: 2021-05-13

## 2021-05-13 RX ADMIN — ALBUTEROL SULFATE 4 PUFF: 90 AEROSOL, METERED RESPIRATORY (INHALATION) at 09:49

## 2021-05-13 RX ADMIN — Medication 1 EACH: at 09:50

## 2021-05-13 NOTE — PROGRESS NOTES
Subjective:      Nanda Gutierrez is a 50 y.o. female who presents with URI (x 2 weeks with cough and headaches)            This is a new problem.  50-year-old non-smoker present for evaluation of cough which is at times productive for the past 3 weeks.  Started out with the upper respiratory cold but the cough persisted.  Denies any wheezing or shortness of breath but she goes into really bad coughing spells.  Review of system otherwise negative.      Review of Systems   All other systems reviewed and are negative.         Objective:     /70 (BP Location: Left arm, Patient Position: Sitting, BP Cuff Size: Adult long)   Pulse 94   Temp 37.1 °C (98.7 °F) (Temporal)   Resp 16   Ht 1.524 m (5')   Wt 83.5 kg (184 lb)   SpO2 95%   BMI 35.94 kg/m²      Physical Exam  Constitutional:       General: She is not in acute distress.     Appearance: She is not ill-appearing, toxic-appearing or diaphoretic.   HENT:      Head: Normocephalic and atraumatic.      Right Ear: Tympanic membrane, ear canal and external ear normal.      Left Ear: Tympanic membrane, ear canal and external ear normal.      Nose: No rhinorrhea.      Mouth/Throat:      Mouth: Mucous membranes are moist.      Pharynx: Oropharynx is clear. No oropharyngeal exudate or posterior oropharyngeal erythema.   Eyes:      Conjunctiva/sclera: Conjunctivae normal.   Cardiovascular:      Rate and Rhythm: Normal rate.      Heart sounds: No murmur heard.   No gallop.    Pulmonary:      Effort: Pulmonary effort is normal. Prolonged expiration present. No respiratory distress.      Breath sounds: No stridor. Examination of the right-lower field reveals decreased breath sounds. Decreased breath sounds present. No wheezing, rhonchi or rales.      Comments: After albuterol MDI patient feels slightly better.  Musculoskeletal:      Cervical back: Neck supple.   Lymphadenopathy:      Cervical: No cervical adenopathy.   Skin:     General: Skin is warm.      Coloration: Skin  is not jaundiced or pale.   Neurological:      Mental Status: She is alert and oriented to person, place, and time.   Psychiatric:         Thought Content: Thought content normal.          Chest x-ray is negative              Assessment/Plan:       ASSESSMENT:PLAN:  1. Acute bronchiolitis with bronchospasm  - azithromycin (ZITHROMAX) 250 MG Tab; 500mg on day One, then 250mg daily until finished  Dispense: 6 tablet; Refill: 0  - predniSONE (DELTASONE) 20 MG Tab; Take 40mg daily x 5 days  Dispense: 10 tablet; Refill: 0  - albuterol 108 (90 Base) MCG/ACT Aero Soln inhalation aerosol; Inhale 2 Puffs every four hours as needed (wheezing).  Dispense: 1 Each; Refill: 0    2. Cough  - DX-CHEST-2 VIEWS; Future  - albuterol inhaler 4 Puff  - AEROCHAMBER PLUS-MEDIUM MASK MISC 1 Each      Plan per orders and instructions  Warning signs reviewed  Follow up if not significantly improved as expected in 7-10 days, sooner if any worsening or new symptoms

## 2021-05-13 NOTE — PATIENT INSTRUCTIONS
Start your oral antibiotic daily as directed for 5 days  Start oral steroid daily as directed for 5 days  Use your albuterol inhaler with the Spacer every 4 hours as needed for wheezing  OTC medication for cough  Follow up if not significantly improved as expected in 7 days, sooner if any worsening or new symptoms        Bronchitis  Bronchitis is a problem of the air tubes leading to your lungs. This problem makes it hard for air to get in and out of the lungs. You may cough a lot because your air tubes are narrow. Going without care can cause lasting (chronic) bronchitis.  HOME CARE   · Drink enough fluids to keep your pee (urine) clear or pale yellow.  · Use a cool mist humidifier.  · Quit smoking if you smoke. If you keep smoking, the bronchitis might not get better.  · Only take medicine as told by your doctor.  GET HELP RIGHT AWAY IF:   · Coughing keeps you awake.  · You start to wheeze.  · You become more sick or weak.  · You have a hard time breathing or get short of breath.  · You cough up blood.  · Coughing lasts more than 2 weeks.  · You have a fever.  · Your baby is older than 3 months with a rectal temperature of 102° F (38.9° C) or higher.  · Your baby is 3 months old or younger with a rectal temperature of 100.4° F (38° C) or higher.  MAKE SURE YOU:  · Understand these instructions.  · Will watch your condition.  · Will get help right away if you are not doing well or get worse.  Document Released: 06/05/2009 Document Revised: 03/11/2013 Document Reviewed: 11/19/2010  [employer]® Patient Information ©2014 [employer].    Bronchospasm, Adult  A bronchospasm is a spasm or tightening of the airways going into the lungs. During a bronchospasm breathing becomes more difficult because the airways get smaller. When this happens there can be coughing, a whistling sound when breathing (wheezing), and difficulty breathing. Bronchospasm is often associated with asthma, but not all patients who experience a  bronchospasm have asthma.  What are the causes?  A bronchospasm is caused by inflammation or irritation of the airways. The inflammation or irritation may be triggered by:  · Allergies (such as to animals, pollen, food, or mold). Allergens that cause bronchospasm may cause wheezing immediately after exposure or many hours later.  · Infection. Viral infections are believed to be the most common cause of bronchospasm.  · Exercise.  · Irritants (such as pollution, cigarette smoke, strong odors, aerosol sprays, and paint fumes).  · Weather changes. Winds increase molds and pollens in the air. Rain refreshes the air by washing irritants out. Cold air may cause inflammation.  · Stress and emotional upset.  What are the signs or symptoms?  · Wheezing.  · Excessive nighttime coughing.  · Frequent or severe coughing with a simple cold.  · Chest tightness.  · Shortness of breath.  How is this diagnosed?  Bronchospasm is usually diagnosed through a history and physical exam. Tests, such as chest X-rays, are sometimes done to look for other conditions.  How is this treated?  · Inhaled medicines can be given to open up your airways and help you breathe. The medicines can be given using either an inhaler or a nebulizer machine.  · Corticosteroid medicines may be given for severe bronchospasm, usually when it is associated with asthma.  Follow these instructions at home:  · Always have a plan prepared for seeking medical care. Know when to call your health care provider and local emergency services (911 in the [place] Know where you can access local emergency care.  · Only take medicines as directed by your health care provider.  · If you were prescribed an inhaler or nebulizer machine, ask your health care provider to explain how to use it correctly. Always use a spacer with your inhaler if you were given one.  · It is necessary to remain calm during an attack. Try to relax and breathe more slowly.  · Control your home environment  in the following ways:  ¨ Change your heating and air conditioning filter at least once a month.  ¨ Limit your use of fireplaces and wood stoves.  ¨ Do not smoke and do not allow smoking in your home.  ¨ Avoid exposure to perfumes and fragrances.  ¨ Get rid of pests (such as roaches and mice) and their droppings.  ¨ Throw away plants if you see mold on them.  ¨ Keep your house clean and dust free.  ¨ Replace carpet with wood, tile, or vinyl deandra. Carpet can trap dander and dust.  ¨ Use allergy-proof pillows, mattress covers, and box spring covers.  ¨ Wash bed sheets and blankets every week in hot water and dry them in a dryer.  ¨ Use blankets that are made of polyester or cotton.  ¨ Wash hands frequently.  Contact a health care provider if:  · You have muscle aches.  · You have chest pain.  · The sputum changes from clear or white to yellow, green, gray, or bloody.  · The sputum you cough up gets thicker.  · There are problems that may be related to the medicine you are given, such as a rash, itching, swelling, or trouble breathing.  Get help right away if:  · You have worsening wheezing and coughing even after taking your prescribed medicines.  · You have increased difficulty breathing.  · You develop severe chest pain.  This information is not intended to replace advice given to you by your health care provider. Make sure you discuss any questions you have with your health care provider.  Document Released: 12/20/2004 Document Revised: 05/25/2017 Document Reviewed: 06/09/2014  [employer] Interactive Patient Education © 2017 [employer]

## 2021-05-23 ENCOUNTER — OFFICE VISIT (OUTPATIENT)
Dept: URGENT CARE | Facility: PHYSICIAN GROUP | Age: 51
End: 2021-05-23
Payer: COMMERCIAL

## 2021-05-23 VITALS
BODY MASS INDEX: 35.93 KG/M2 | RESPIRATION RATE: 12 BRPM | WEIGHT: 183 LBS | HEIGHT: 60 IN | SYSTOLIC BLOOD PRESSURE: 108 MMHG | OXYGEN SATURATION: 100 % | TEMPERATURE: 98.7 F | HEART RATE: 95 BPM | DIASTOLIC BLOOD PRESSURE: 82 MMHG

## 2021-05-23 DIAGNOSIS — J40 BRONCHITIS: ICD-10-CM

## 2021-05-23 PROCEDURE — 99214 OFFICE O/P EST MOD 30 MIN: CPT | Performed by: PHYSICIAN ASSISTANT

## 2021-05-23 RX ORDER — COVID-19 MOLECULAR TEST ASSAY
KIT MISCELLANEOUS
COMMUNITY
Start: 2021-05-16 | End: 2021-05-23

## 2021-05-23 RX ORDER — CODEINE PHOSPHATE AND GUAIFENESIN 10; 100 MG/5ML; MG/5ML
5 SOLUTION ORAL EVERY 12 HOURS PRN
Qty: 100 ML | Refills: 0 | Status: SHIPPED | OUTPATIENT
Start: 2021-05-23 | End: 2021-06-02

## 2021-05-23 RX ORDER — BENZONATATE 200 MG/1
200 CAPSULE ORAL 3 TIMES DAILY PRN
Qty: 30 CAPSULE | Refills: 0 | Status: SHIPPED | OUTPATIENT
Start: 2021-05-23 | End: 2022-10-11

## 2021-05-23 RX ORDER — ALBUTEROL SULFATE 2.5 MG/3ML
SOLUTION RESPIRATORY (INHALATION)
Qty: 30 EACH | Refills: 0 | Status: SHIPPED | OUTPATIENT
Start: 2021-05-23 | End: 2022-10-11

## 2021-05-23 NOTE — LETTER
May 23, 2021         Patient: Nanda Gutierrez   YOB: 1970   Date of Visit: 5/23/2021           To Whom it May Concern:    Nanda Gutierrez was seen in my clinic on 5/23/2021. She may return to work 5/24/21. 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 She has been fully COVID vaccinated and had a negative COVID test last week.     If you have any questions or concerns, please don't hesitate to call.        Sincerely,           Fátima Hernandez P.A.-C.  Electronically Signed

## 2021-05-23 NOTE — PROGRESS NOTES
Chief Complaint   Patient presents with   • Follow-Up     On a respiritory infection       HISTORY OF PRESENT ILLNESS: Patient is a 50 y.o. female who presents today for the following:    Patient is here for reevaluation of a cough.  She states that she is been coughing for the last month.  She has not any fevers.  She does report some shortness of breath, especially when she gets into coughing fits.  She has had a hard time sleeping.  She had a negative chest x-ray on her last visit and was given azithromycin and prednisone.  She has not noticed any change in symptoms.  The albuterol inhaler seems to help the most.    Patient Active Problem List    Diagnosis Date Noted   • NSAID long-term use 01/29/2019   • Eczema 01/29/2019   • Excessive daytime sleepiness 05/19/2017   • Thyroid nodule 05/19/2017   • Class 1 obesity due to excess calories without serious comorbidity with body mass index (BMI) of 34.0 to 34.9 in adult 07/07/2015   • Prediabetes 12/30/2014   • Thoracic back pain 12/22/2014   • Vitamin D deficiency 05/10/2014   • Dyslipidemia 05/10/2014   • Chronic headaches 04/30/2014       Allergies:Pcn [penicillins]    Current Outpatient Medications Ordered in Epic   Medication Sig Dispense Refill   • benzonatate (TESSALON) 200 MG capsule Take 1 capsule by mouth 3 times a day as needed for Cough. 30 capsule 0   • guaifenesin-codeine (ROBITUSSIN AC) Solution oral solution Take 5 mL by mouth every 12 hours as needed for Cough for up to 10 days. 100 mL 0   • albuterol (PROVENTIL) 2.5mg/3ml Nebu Soln solution for nebulization Use 1 vial via nebulizer up to every 4 hours as needed for shortness of breath 30 Each 0   • ipratropium (ATROVENT) 0.02 % Solution Use 1 vial via nebulizer up to every 4 hours as needed for shortness of breath 30 Each 0   • albuterol 108 (90 Base) MCG/ACT Aero Soln inhalation aerosol Inhale 2 Puffs every four hours as needed (wheezing). 1 Each 0   • ibuprofen (MOTRIN) 800 MG Tab TAKE 1 TABLET BY  MOUTH EVERY 8 HOURS AS NEEDED FOR MILD PAIN 90 Tab 0     No current Epic-ordered facility-administered medications on file.       Past Medical History:   Diagnosis Date   • Anemia    • Chronic headaches 4/30/2014    X years Since a kid Motrin helps - only thing that helps Would like rx   • Hyperlipidemia    • Thyroid nodule    • Urinary tract infection, site not specified        Social History     Tobacco Use   • Smoking status: Never Smoker   • Smokeless tobacco: Never Used   Substance Use Topics   • Alcohol use: No   • Drug use: No     Comment: denies h/o heroin, meth, cocaine, IVDU       Family Status   Relation Name Status   • MGMo  (Not Specified)   • MGFa  (Not Specified)   • PGMo  (Not Specified)   • Mo  Alive   • Fa  Alive   • Child  Alive     Family History   Problem Relation Age of Onset   • Cancer Maternal Grandmother 56        pancreatic   • Heart Disease Maternal Grandfather 40        MI   • Diabetes Paternal Grandmother    • Diabetes Mother    • Stroke Mother    • Heart Disease Mother    • Stroke Father         possibly not a stroke? possibly TIA?    • Depression Child        Review of Systems:   Constitutional ROS: No unexpected change in weight  Pulmonary ROS: Intermittent shortness of breath.  Cardiovascular ROS: No diaphoresis, No edema, No palpitations  Hematologic/Lymphatic ROS: No chills, No night sweats, No weight loss  Skin/Integumentary ROS: No edema, No evidence of rash, No itching    Exam:  /82   Pulse 95   Temp 37.1 °C (98.7 °F) (Temporal)   Resp 12   Ht 1.524 m (5')   Wt 83 kg (183 lb)   SpO2 100%   General: Well developed, well nourished. No distress.    HEENT: Head is grossly normal.  Pulmonary: Unlabored respiratory effort. Lungs clear to auscultation, no wheezes, no rhonchi.  Coughing during most of her clinic visit.  Cardiovascular: Regular rate and rhythm without murmur.   Neurologic: Grossly nonfocal. No facial asymmetry noted.  Skin: Warm, dry, good turgor. No rashes  in visible areas.   Psych: Normal mood. Alert and oriented to person, place and time.    Assessment/Plan:  Discussed likely viral etiology versus seasonal allergies.  Negative chest x-ray.  100% on room air.  Starting cough suppressant and nebulizer medication. Follow up for worsening or persistent symptoms.  1. Bronchitis  benzonatate (TESSALON) 200 MG capsule    guaifenesin-codeine (ROBITUSSIN AC) Solution oral solution    albuterol (PROVENTIL) 2.5mg/3ml Nebu Soln solution for nebulization    ipratropium (ATROVENT) 0.02 % Solution

## 2022-10-11 ENCOUNTER — OFFICE VISIT (OUTPATIENT)
Dept: URGENT CARE | Facility: PHYSICIAN GROUP | Age: 52
End: 2022-10-11
Payer: COMMERCIAL

## 2022-10-11 VITALS
HEIGHT: 61 IN | DIASTOLIC BLOOD PRESSURE: 80 MMHG | TEMPERATURE: 98 F | SYSTOLIC BLOOD PRESSURE: 126 MMHG | RESPIRATION RATE: 16 BRPM | WEIGHT: 171.4 LBS | HEART RATE: 91 BPM | OXYGEN SATURATION: 98 % | BODY MASS INDEX: 32.36 KG/M2

## 2022-10-11 DIAGNOSIS — J02.0 STREP THROAT: ICD-10-CM

## 2022-10-11 PROCEDURE — 99213 OFFICE O/P EST LOW 20 MIN: CPT | Performed by: FAMILY MEDICINE

## 2022-10-11 RX ORDER — AZITHROMYCIN 250 MG/1
TABLET, FILM COATED ORAL
Qty: 6 TABLET | Refills: 0 | Status: SHIPPED | OUTPATIENT
Start: 2022-10-11 | End: 2023-08-29

## 2022-10-12 NOTE — PROGRESS NOTES
"CC:  cough        Cough  This is a new problem. The current episode started 2 days ago. The problem has been unchanged. The problem occurs constantly. The cough is dry. Associated symptoms include : fatigue,  sore throat,  fever. Pertinent negatives include no   headaches, nausea, vomiting, diarrhea, sweats, weight loss or wheezing. Nothing aggravates the symptoms.  Patient has tried nothing for the symptoms. There is no history of asthma.        Past Medical History:   Diagnosis Date    Chronic headaches 4/30/2014    X years Since a kid Motrin helps - only thing that helps Would like rx    Anemia     Hyperlipidemia     Thyroid nodule     Urinary tract infection, site not specified          Social History     Tobacco Use    Smoking status: Never    Smokeless tobacco: Never   Substance Use Topics    Alcohol use: No    Drug use: No     Comment: denies h/o heroin, meth, cocaine, IVDU         Current Outpatient Medications on File Prior to Visit   Medication Sig Dispense Refill    ibuprofen (MOTRIN) 800 MG Tab TAKE 1 TABLET BY MOUTH EVERY 8 HOURS AS NEEDED FOR MILD PAIN 90 Tab 0     No current facility-administered medications on file prior to visit.                    Review of Systems    HENT: negative for otalgia  Cardiovascular - denies chest pain or dyspnea  Respiratory: Positive for cough.  .  Negative for wheezing.    Neurological: Negative for headaches.   GI - denies nausea, vomiting or diarrhea  Neuro - denies numbness or tingling.            Objective:     /80 (BP Location: Left arm, Patient Position: Sitting, BP Cuff Size: Adult)   Pulse 91   Temp 36.7 °C (98 °F) (Temporal)   Resp 16   Ht 1.549 m (5' 1\")   Wt 77.7 kg (171 lb 6.4 oz)   SpO2 98%     Physical Exam   Constitutional: patient is oriented to person, place, and time. Patient appears well-developed and well-nourished. No distress.   HENT:   Head: Normocephalic and atraumatic.   Right Ear: External ear normal.   Left Ear: External ear " normal.   Nose: Mucosal edema  present. Right sinus exhibits no maxillary sinus tenderness. Left sinus exhibits no maxillary sinus tenderness.   Mouth/Throat: Mucous membranes are normal. No oral lesions.  +  posterior pharyngeal erythema.  No oropharyngeal exudate or posterior oropharyngeal edema.  Uvula midline    Eyes: Conjunctivae and EOM are normal. Pupils are equal, round, and reactive to light. Right eye exhibits no discharge. Left eye exhibits no discharge. No scleral icterus.   Neck: Normal range of motion. Neck supple. No tracheal deviation present.   Cardiovascular: Normal rate, regular rhythm and normal heart sounds.  Exam reveals no friction rub.    Pulmonary/Chest: Effort normal. No respiratory distress. Patient has no wheezes or rhonchi. Patient has no rales.    Musculoskeletal:  exhibits no edema.   Lymphadenopathy:     Patient has no cervical adenopathy.      Neurological: patient is alert and oriented to person, place, and time.   Skin: Skin is warm and dry. No rash noted. No erythema.   Psychiatric: patient  has a normal mood and affect.  behavior is normal.   Nursing note and vitals reviewed.              Assessment/Plan:         1. Strep throat  Rapid strep positive    PCN allergic     - azithromycin (ZITHROMAX) 250 MG Tab; Take as directed  Dispense: 6 Tablet; Refill: 0     Follow up in one week if no improvement

## 2023-08-28 SDOH — ECONOMIC STABILITY: INCOME INSECURITY: HOW HARD IS IT FOR YOU TO PAY FOR THE VERY BASICS LIKE FOOD, HOUSING, MEDICAL CARE, AND HEATING?: NOT VERY HARD

## 2023-08-28 SDOH — ECONOMIC STABILITY: TRANSPORTATION INSECURITY
IN THE PAST 12 MONTHS, HAS THE LACK OF TRANSPORTATION KEPT YOU FROM MEDICAL APPOINTMENTS OR FROM GETTING MEDICATIONS?: NO

## 2023-08-28 SDOH — ECONOMIC STABILITY: HOUSING INSECURITY
IN THE LAST 12 MONTHS, WAS THERE A TIME WHEN YOU DID NOT HAVE A STEADY PLACE TO SLEEP OR SLEPT IN A SHELTER (INCLUDING NOW)?: NO

## 2023-08-28 SDOH — ECONOMIC STABILITY: FOOD INSECURITY: WITHIN THE PAST 12 MONTHS, THE FOOD YOU BOUGHT JUST DIDN'T LAST AND YOU DIDN'T HAVE MONEY TO GET MORE.: NEVER TRUE

## 2023-08-28 SDOH — ECONOMIC STABILITY: TRANSPORTATION INSECURITY
IN THE PAST 12 MONTHS, HAS LACK OF TRANSPORTATION KEPT YOU FROM MEETINGS, WORK, OR FROM GETTING THINGS NEEDED FOR DAILY LIVING?: NO

## 2023-08-28 SDOH — ECONOMIC STABILITY: TRANSPORTATION INSECURITY
IN THE PAST 12 MONTHS, HAS LACK OF RELIABLE TRANSPORTATION KEPT YOU FROM MEDICAL APPOINTMENTS, MEETINGS, WORK OR FROM GETTING THINGS NEEDED FOR DAILY LIVING?: NO

## 2023-08-28 SDOH — ECONOMIC STABILITY: FOOD INSECURITY: WITHIN THE PAST 12 MONTHS, YOU WORRIED THAT YOUR FOOD WOULD RUN OUT BEFORE YOU GOT MONEY TO BUY MORE.: NEVER TRUE

## 2023-08-28 SDOH — HEALTH STABILITY: PHYSICAL HEALTH: ON AVERAGE, HOW MANY MINUTES DO YOU ENGAGE IN EXERCISE AT THIS LEVEL?: 10 MIN

## 2023-08-28 SDOH — ECONOMIC STABILITY: INCOME INSECURITY: IN THE LAST 12 MONTHS, WAS THERE A TIME WHEN YOU WERE NOT ABLE TO PAY THE MORTGAGE OR RENT ON TIME?: NO

## 2023-08-28 SDOH — HEALTH STABILITY: PHYSICAL HEALTH: ON AVERAGE, HOW MANY DAYS PER WEEK DO YOU ENGAGE IN MODERATE TO STRENUOUS EXERCISE (LIKE A BRISK WALK)?: 0 DAYS

## 2023-08-28 SDOH — ECONOMIC STABILITY: HOUSING INSECURITY: IN THE LAST 12 MONTHS, HOW MANY PLACES HAVE YOU LIVED?: 1

## 2023-08-28 SDOH — HEALTH STABILITY: MENTAL HEALTH
STRESS IS WHEN SOMEONE FEELS TENSE, NERVOUS, ANXIOUS, OR CAN'T SLEEP AT NIGHT BECAUSE THEIR MIND IS TROUBLED. HOW STRESSED ARE YOU?: RATHER MUCH

## 2023-08-28 ASSESSMENT — SOCIAL DETERMINANTS OF HEALTH (SDOH)
HOW OFTEN DO YOU ATTENT MEETINGS OF THE CLUB OR ORGANIZATION YOU BELONG TO?: NEVER
HOW OFTEN DO YOU ATTENT MEETINGS OF THE CLUB OR ORGANIZATION YOU BELONG TO?: NEVER
HOW OFTEN DO YOU HAVE SIX OR MORE DRINKS ON ONE OCCASION: NEVER
HOW HARD IS IT FOR YOU TO PAY FOR THE VERY BASICS LIKE FOOD, HOUSING, MEDICAL CARE, AND HEATING?: NOT VERY HARD
HOW OFTEN DO YOU HAVE A DRINK CONTAINING ALCOHOL: NEVER
HOW OFTEN DO YOU GET TOGETHER WITH FRIENDS OR RELATIVES?: MORE THAN THREE TIMES A WEEK
ARE YOU MARRIED, WIDOWED, DIVORCED, SEPARATED, NEVER MARRIED, OR LIVING WITH A PARTNER?: LIVING WITH PARTNER
HOW OFTEN DO YOU ATTEND CHURCH OR RELIGIOUS SERVICES?: NEVER
HOW MANY DRINKS CONTAINING ALCOHOL DO YOU HAVE ON A TYPICAL DAY WHEN YOU ARE DRINKING: PATIENT DOES NOT DRINK
ARE YOU MARRIED, WIDOWED, DIVORCED, SEPARATED, NEVER MARRIED, OR LIVING WITH A PARTNER?: LIVING WITH PARTNER
IN A TYPICAL WEEK, HOW MANY TIMES DO YOU TALK ON THE PHONE WITH FAMILY, FRIENDS, OR NEIGHBORS?: MORE THAN THREE TIMES A WEEK
HOW OFTEN DO YOU GET TOGETHER WITH FRIENDS OR RELATIVES?: MORE THAN THREE TIMES A WEEK
IN A TYPICAL WEEK, HOW MANY TIMES DO YOU TALK ON THE PHONE WITH FAMILY, FRIENDS, OR NEIGHBORS?: MORE THAN THREE TIMES A WEEK
WITHIN THE PAST 12 MONTHS, YOU WORRIED THAT YOUR FOOD WOULD RUN OUT BEFORE YOU GOT THE MONEY TO BUY MORE: NEVER TRUE
DO YOU BELONG TO ANY CLUBS OR ORGANIZATIONS SUCH AS CHURCH GROUPS UNIONS, FRATERNAL OR ATHLETIC GROUPS, OR SCHOOL GROUPS?: NO
HOW OFTEN DO YOU ATTEND CHURCH OR RELIGIOUS SERVICES?: NEVER
DO YOU BELONG TO ANY CLUBS OR ORGANIZATIONS SUCH AS CHURCH GROUPS UNIONS, FRATERNAL OR ATHLETIC GROUPS, OR SCHOOL GROUPS?: NO

## 2023-08-28 ASSESSMENT — LIFESTYLE VARIABLES
SKIP TO QUESTIONS 9-10: 1
HOW MANY STANDARD DRINKS CONTAINING ALCOHOL DO YOU HAVE ON A TYPICAL DAY: PATIENT DOES NOT DRINK
HOW OFTEN DO YOU HAVE SIX OR MORE DRINKS ON ONE OCCASION: NEVER
AUDIT-C TOTAL SCORE: 0
HOW OFTEN DO YOU HAVE A DRINK CONTAINING ALCOHOL: NEVER

## 2023-08-29 ENCOUNTER — OFFICE VISIT (OUTPATIENT)
Dept: MEDICAL GROUP | Facility: MEDICAL CENTER | Age: 53
End: 2023-08-29
Payer: COMMERCIAL

## 2023-08-29 VITALS
WEIGHT: 177.2 LBS | DIASTOLIC BLOOD PRESSURE: 68 MMHG | HEART RATE: 79 BPM | BODY MASS INDEX: 34.79 KG/M2 | OXYGEN SATURATION: 100 % | TEMPERATURE: 97.6 F | SYSTOLIC BLOOD PRESSURE: 120 MMHG | HEIGHT: 60 IN | RESPIRATION RATE: 19 BRPM

## 2023-08-29 DIAGNOSIS — E78.5 DYSLIPIDEMIA: ICD-10-CM

## 2023-08-29 DIAGNOSIS — G44.229 CHRONIC TENSION-TYPE HEADACHE, NOT INTRACTABLE: ICD-10-CM

## 2023-08-29 DIAGNOSIS — Z12.11 COLON CANCER SCREENING: ICD-10-CM

## 2023-08-29 DIAGNOSIS — E04.1 THYROID NODULE: ICD-10-CM

## 2023-08-29 DIAGNOSIS — E66.09 CLASS 1 OBESITY DUE TO EXCESS CALORIES WITHOUT SERIOUS COMORBIDITY WITH BODY MASS INDEX (BMI) OF 34.0 TO 34.9 IN ADULT: ICD-10-CM

## 2023-08-29 DIAGNOSIS — L30.9 ECZEMA, UNSPECIFIED TYPE: ICD-10-CM

## 2023-08-29 DIAGNOSIS — Z12.31 ENCOUNTER FOR SCREENING MAMMOGRAM FOR MALIGNANT NEOPLASM OF BREAST: ICD-10-CM

## 2023-08-29 DIAGNOSIS — R73.03 PREDIABETES: ICD-10-CM

## 2023-08-29 DIAGNOSIS — K21.9 GASTROESOPHAGEAL REFLUX DISEASE WITHOUT ESOPHAGITIS: ICD-10-CM

## 2023-08-29 PROCEDURE — 99214 OFFICE O/P EST MOD 30 MIN: CPT | Performed by: STUDENT IN AN ORGANIZED HEALTH CARE EDUCATION/TRAINING PROGRAM

## 2023-08-29 PROCEDURE — 3078F DIAST BP <80 MM HG: CPT | Performed by: STUDENT IN AN ORGANIZED HEALTH CARE EDUCATION/TRAINING PROGRAM

## 2023-08-29 PROCEDURE — 3074F SYST BP LT 130 MM HG: CPT | Performed by: STUDENT IN AN ORGANIZED HEALTH CARE EDUCATION/TRAINING PROGRAM

## 2023-08-29 RX ORDER — TRIAMCINOLONE ACETONIDE 5 MG/G
CREAM TOPICAL
Qty: 60 G | Refills: 0 | Status: SHIPPED | OUTPATIENT
Start: 2023-08-29

## 2023-08-29 RX ORDER — SUMATRIPTAN 50 MG/1
50 TABLET, FILM COATED ORAL
Qty: 10 TABLET | Refills: 3 | Status: SHIPPED | OUTPATIENT
Start: 2023-08-29

## 2023-08-29 RX ORDER — OMEPRAZOLE 20 MG/1
20 CAPSULE, DELAYED RELEASE ORAL 2 TIMES DAILY
Qty: 60 CAPSULE | Refills: 0 | Status: SHIPPED | OUTPATIENT
Start: 2023-08-29

## 2023-08-29 ASSESSMENT — PATIENT HEALTH QUESTIONNAIRE - PHQ9
5. POOR APPETITE OR OVEREATING: 2 - MORE THAN HALF THE DAYS
SUM OF ALL RESPONSES TO PHQ QUESTIONS 1-9: 12
CLINICAL INTERPRETATION OF PHQ2 SCORE: 3

## 2023-08-29 NOTE — PROGRESS NOTES
"Subjective:     Chief Complaint   Patient presents with    Establish Care     Heartburn about a month  Left ear itches all the time          HPI:   Nanda presents today to establish care. Previous 2019.    Prediabetes  Patient with previous diagnosis of prediabetes, last evaluated in 2019 with A1c of 5.8%.  Due for recheck.    Vitamin D deficiency  Vitamin D level at 17 in 2019.  Due for recheck.    Thyroid nodule  Previous diagnosis of thyroid nodule.  Patient due for recheck.    Chronic migraines   Patient with a history of chronic headaches/migraines.  Patient with  Long-term NSAID use.  Patient notes that she takes ibuprofen 800 mg 2-3 times per week and has been doing this for several years.  We will trial Imitrex for migraines.    Heartburn  Patient presents today for concern about heartburn.  Patient notes that she has acid reflux on and off in the past but this time is lasting longer, not going away.  Patient has tried \"the counter Nexium with minimal relief.  Patient notes that she wakes up feeling nauseous with an acidic taste in her mouth.  Patient notes that everything she eats makes it feel worse nothing making it feel better.,  Patient notes that she does not drink alcohol but does drink soda and caffeine.  Patient takes ibuprofen regularly.  Patient also notes she has been under increased stress.    Chronic left ear itching  Patient notes that her left ear has been itching for several years.  Patient has not tried any treatments.    ROS:  Gen: no fevers/chills  Pulm: no sob, no cough  CV: no chest pain, no palpitations  GI: no nausea/vomiting, no diarrhea        Objective:     Exam:  /68   Pulse 79   Temp 36.4 °C (97.6 °F) (Temporal)   Resp 19   Ht 1.524 m (5')   Wt 80.4 kg (177 lb 3.2 oz)   SpO2 100%   BMI 34.61 kg/m²  Body mass index is 34.61 kg/m².    Gen: Alert and oriented, No apparent distress.  Neck: Neck is supple without lymphadenopathy.  Lungs: Normal effort, CTA bilaterally, no " wheezes, rhonchi, or rales  CV: Regular rate and rhythm. No murmurs, rubs, or gallops.  Ext: No clubbing, cyanosis, edema.      Assessment & Plan:     52 y.o. female with the following -     1. Thyroid nodule  - US-THYROID; Future    2. Gastroesophageal reflux disease without esophagitis  - omeprazole (PRILOSEC) 20 MG delayed-release capsule; Take 1 Capsule by mouth 2 times a day.  Dispense: 60 Capsule; Refill: 0    3. Prediabetes  - HEMOGLOBIN A1C; Future  - Comp Metabolic Panel; Future  - TSH WITH REFLEX TO FT4; Future  - CBC WITHOUT DIFFERENTIAL; Future    4. Dyslipidemia  - Lipid Profile; Future    5. Encounter for screening mammogram for malignant neoplasm of breast  - MA-SCREENING MAMMO BILAT W/TOMOSYNTHESIS W/CAD; Future    6. Colon cancer screening  - COLOGUARD (FIT DNA)    7. Class 1 obesity due to excess calories without serious comorbidity with body mass index (BMI) of 34.0 to 34.9 in adult      8. Eczema, unspecified type  - triamcinolone acetonide (KENALOG) 0.5 % Cream; Apply to affected area up to 3 times a day.  Dispense: 60 g; Refill: 0    9. Chronic tension-type headache, not intractable  - SUMAtriptan (IMITREX) 50 MG Tab; Take 1 Tablet by mouth one time as needed for Migraine for up to 1 dose. Max dose 100mg  Dispense: 10 Tablet; Refill: 3     No follow-ups on file.    Please note that this dictation was created using voice recognition software. I have made every reasonable attempt to correct obvious errors, but I expect that there are errors of grammar and possibly content that I did not discover before finalizing the note.

## 2023-09-05 ENCOUNTER — HOSPITAL ENCOUNTER (OUTPATIENT)
Dept: LAB | Facility: MEDICAL CENTER | Age: 53
End: 2023-09-05
Attending: STUDENT IN AN ORGANIZED HEALTH CARE EDUCATION/TRAINING PROGRAM
Payer: COMMERCIAL

## 2023-09-05 DIAGNOSIS — R73.03 PREDIABETES: ICD-10-CM

## 2023-09-05 DIAGNOSIS — E78.5 DYSLIPIDEMIA: ICD-10-CM

## 2023-09-05 LAB
ALBUMIN SERPL BCP-MCNC: 4.2 G/DL (ref 3.2–4.9)
ALBUMIN/GLOB SERPL: 1.1 G/DL
ALP SERPL-CCNC: 111 U/L (ref 30–99)
ALT SERPL-CCNC: 10 U/L (ref 2–50)
ANION GAP SERPL CALC-SCNC: 9 MMOL/L (ref 7–16)
AST SERPL-CCNC: 21 U/L (ref 12–45)
BILIRUB SERPL-MCNC: 0.3 MG/DL (ref 0.1–1.5)
BUN SERPL-MCNC: 10 MG/DL (ref 8–22)
CALCIUM ALBUM COR SERPL-MCNC: 8.9 MG/DL (ref 8.5–10.5)
CALCIUM SERPL-MCNC: 9.1 MG/DL (ref 8.5–10.5)
CHLORIDE SERPL-SCNC: 103 MMOL/L (ref 96–112)
CHOLEST SERPL-MCNC: 175 MG/DL (ref 100–199)
CO2 SERPL-SCNC: 26 MMOL/L (ref 20–33)
CREAT SERPL-MCNC: 0.72 MG/DL (ref 0.5–1.4)
ERYTHROCYTE [DISTWIDTH] IN BLOOD BY AUTOMATED COUNT: 43.9 FL (ref 35.9–50)
EST. AVERAGE GLUCOSE BLD GHB EST-MCNC: 128 MG/DL
FASTING STATUS PATIENT QL REPORTED: NORMAL
GFR SERPLBLD CREATININE-BSD FMLA CKD-EPI: 100 ML/MIN/1.73 M 2
GLOBULIN SER CALC-MCNC: 3.7 G/DL (ref 1.9–3.5)
GLUCOSE SERPL-MCNC: 85 MG/DL (ref 65–99)
HBA1C MFR BLD: 6.1 % (ref 4–5.6)
HCT VFR BLD AUTO: 37.6 % (ref 37–47)
HDLC SERPL-MCNC: 46 MG/DL
HGB BLD-MCNC: 12.1 G/DL (ref 12–16)
LDLC SERPL CALC-MCNC: 106 MG/DL
MCH RBC QN AUTO: 27.4 PG (ref 27–33)
MCHC RBC AUTO-ENTMCNC: 32.2 G/DL (ref 32.2–35.5)
MCV RBC AUTO: 85.3 FL (ref 81.4–97.8)
PLATELET # BLD AUTO: 325 K/UL (ref 164–446)
PMV BLD AUTO: 10.9 FL (ref 9–12.9)
POTASSIUM SERPL-SCNC: 4.2 MMOL/L (ref 3.6–5.5)
PROT SERPL-MCNC: 7.9 G/DL (ref 6–8.2)
RBC # BLD AUTO: 4.41 M/UL (ref 4.2–5.4)
SODIUM SERPL-SCNC: 138 MMOL/L (ref 135–145)
TRIGL SERPL-MCNC: 114 MG/DL (ref 0–149)
TSH SERPL DL<=0.005 MIU/L-ACNC: 2.52 UIU/ML (ref 0.38–5.33)
WBC # BLD AUTO: 7.7 K/UL (ref 4.8–10.8)

## 2023-09-05 PROCEDURE — 83036 HEMOGLOBIN GLYCOSYLATED A1C: CPT

## 2023-09-05 PROCEDURE — 80053 COMPREHEN METABOLIC PANEL: CPT

## 2023-09-05 PROCEDURE — 36415 COLL VENOUS BLD VENIPUNCTURE: CPT

## 2023-09-05 PROCEDURE — 85027 COMPLETE CBC AUTOMATED: CPT

## 2023-09-05 PROCEDURE — 80061 LIPID PANEL: CPT

## 2023-09-05 PROCEDURE — 84443 ASSAY THYROID STIM HORMONE: CPT

## 2023-09-20 ENCOUNTER — HOSPITAL ENCOUNTER (OUTPATIENT)
Dept: RADIOLOGY | Facility: MEDICAL CENTER | Age: 53
End: 2023-09-20
Attending: STUDENT IN AN ORGANIZED HEALTH CARE EDUCATION/TRAINING PROGRAM
Payer: COMMERCIAL

## 2023-09-20 DIAGNOSIS — E04.1 THYROID NODULE: ICD-10-CM

## 2023-09-20 DIAGNOSIS — Z12.31 ENCOUNTER FOR SCREENING MAMMOGRAM FOR MALIGNANT NEOPLASM OF BREAST: ICD-10-CM

## 2023-09-20 PROCEDURE — 76536 US EXAM OF HEAD AND NECK: CPT

## 2023-09-20 PROCEDURE — 77063 BREAST TOMOSYNTHESIS BI: CPT

## 2023-10-13 ENCOUNTER — APPOINTMENT (OUTPATIENT)
Dept: URGENT CARE | Facility: PHYSICIAN GROUP | Age: 53
End: 2023-10-13
Payer: COMMERCIAL

## 2024-01-10 ENCOUNTER — DOCUMENTATION (OUTPATIENT)
Dept: HEALTH INFORMATION MANAGEMENT | Facility: OTHER | Age: 54
End: 2024-01-10
Payer: COMMERCIAL

## 2024-03-06 ENCOUNTER — TELEPHONE (OUTPATIENT)
Dept: HEALTH INFORMATION MANAGEMENT | Facility: OTHER | Age: 54
End: 2024-03-06
Payer: COMMERCIAL

## 2024-09-04 ENCOUNTER — HOSPITAL ENCOUNTER (OUTPATIENT)
Facility: MEDICAL CENTER | Age: 54
End: 2024-09-04
Payer: COMMERCIAL

## 2024-09-04 ENCOUNTER — OFFICE VISIT (OUTPATIENT)
Dept: URGENT CARE | Facility: PHYSICIAN GROUP | Age: 54
End: 2024-09-04
Payer: COMMERCIAL

## 2024-09-04 VITALS
WEIGHT: 185 LBS | SYSTOLIC BLOOD PRESSURE: 128 MMHG | TEMPERATURE: 98.8 F | HEIGHT: 60 IN | BODY MASS INDEX: 36.32 KG/M2 | OXYGEN SATURATION: 97 % | DIASTOLIC BLOOD PRESSURE: 82 MMHG | HEART RATE: 94 BPM | RESPIRATION RATE: 14 BRPM

## 2024-09-04 DIAGNOSIS — R30.9 PAINFUL URINATION: ICD-10-CM

## 2024-09-04 DIAGNOSIS — R51.9 ACUTE NONINTRACTABLE HEADACHE, UNSPECIFIED HEADACHE TYPE: ICD-10-CM

## 2024-09-04 LAB
APPEARANCE UR: NORMAL
BILIRUB UR STRIP-MCNC: NORMAL MG/DL
COLOR UR AUTO: NORMAL
GLUCOSE UR STRIP.AUTO-MCNC: NORMAL MG/DL
KETONES UR STRIP.AUTO-MCNC: NORMAL MG/DL
LEUKOCYTE ESTERASE UR QL STRIP.AUTO: NORMAL
NITRITE UR QL STRIP.AUTO: NORMAL
PH UR STRIP.AUTO: 6 [PH] (ref 5–8)
PROT UR QL STRIP: 30 MG/DL
RBC UR QL AUTO: NORMAL
SP GR UR STRIP.AUTO: >=1.03
UROBILINOGEN UR STRIP-MCNC: 1 MG/DL

## 2024-09-04 PROCEDURE — 81002 URINALYSIS NONAUTO W/O SCOPE: CPT

## 2024-09-04 PROCEDURE — 3074F SYST BP LT 130 MM HG: CPT

## 2024-09-04 PROCEDURE — 99213 OFFICE O/P EST LOW 20 MIN: CPT

## 2024-09-04 PROCEDURE — 3079F DIAST BP 80-89 MM HG: CPT

## 2024-09-04 PROCEDURE — 87086 URINE CULTURE/COLONY COUNT: CPT

## 2024-09-04 RX ORDER — PHENAZOPYRIDINE HYDROCHLORIDE 200 MG/1
200 TABLET, FILM COATED ORAL 3 TIMES DAILY
COMMUNITY
Start: 2024-08-30 | End: 2024-09-17

## 2024-09-04 RX ORDER — CEFDINIR 300 MG/1
300 CAPSULE ORAL 2 TIMES DAILY
Qty: 14 CAPSULE | Refills: 0 | Status: SHIPPED | OUTPATIENT
Start: 2024-09-04 | End: 2024-09-11

## 2024-09-04 RX ORDER — KETOROLAC TROMETHAMINE 30 MG/ML
30 INJECTION, SOLUTION INTRAMUSCULAR; INTRAVENOUS ONCE
Status: COMPLETED | OUTPATIENT
Start: 2024-09-04 | End: 2024-09-04

## 2024-09-04 RX ORDER — NITROFURANTOIN MACROCRYSTAL 100 MG
100 CAPSULE ORAL 2 TIMES DAILY
COMMUNITY
Start: 2024-08-30 | End: 2024-09-04

## 2024-09-04 RX ORDER — SUMATRIPTAN 6 MG/.5ML
6 INJECTION, SOLUTION SUBCUTANEOUS ONCE
Status: DISCONTINUED | OUTPATIENT
Start: 2024-09-04 | End: 2024-09-04

## 2024-09-04 RX ADMIN — KETOROLAC TROMETHAMINE 30 MG: 30 INJECTION, SOLUTION INTRAMUSCULAR; INTRAVENOUS at 17:15

## 2024-09-04 ASSESSMENT — ENCOUNTER SYMPTOMS
ABDOMINAL PAIN: 0
FEVER: 0
FATIGUE: 1
FLANK PAIN: 0
COUGH: 0
SHORTNESS OF BREATH: 0
VOMITING: 0
DIARRHEA: 0
MYALGIAS: 0
NAUSEA: 0
HEADACHES: 1
SORE THROAT: 0
CHILLS: 0

## 2024-09-04 ASSESSMENT — FIBROSIS 4 INDEX: FIB4 SCORE: 1.08

## 2024-09-04 NOTE — PROGRESS NOTES
Subjective:   Nanda Gutierrez is a 53 y.o. female who presents for Painful Urination (X 9 days, took antibiotic from [facility] for a UTI but didn't help ), Urinary Frequency (X 9 days), and Headache (X 9 days and getting worse)      Urinary Frequency  This is a new problem. Episode onset: Past 9 days. The problem has been gradually worsening. Associated symptoms include fatigue, headaches and urinary symptoms. Pertinent negatives include no abdominal pain, chest pain, chills, congestion, coughing, fever, myalgias, nausea, rash, sore throat or vomiting. Nothing aggravates the symptoms. Treatments tried: macrobid via y. The treatment provided mild relief.       Review of Systems   Constitutional:  Positive for fatigue and malaise/fatigue. Negative for chills and fever.   HENT:  Negative for congestion, ear pain, hearing loss and sore throat.    Respiratory:  Negative for cough and shortness of breath.    Cardiovascular:  Negative for chest pain.   Gastrointestinal:  Negative for abdominal pain, diarrhea, nausea and vomiting.   Genitourinary:  Positive for dysuria and frequency. Negative for flank pain, hematuria and urgency.   Musculoskeletal:  Negative for myalgias.   Skin:  Negative for rash.   Neurological:  Positive for headaches.       Medications, Allergies, and current problem list reviewed today in Epic.     Objective:     /82   Pulse 94   Temp 37.1 °C (98.8 °F) (Temporal)   Resp 14   Ht 1.524 m (5')   Wt 83.9 kg (185 lb)   SpO2 97%     Physical Exam  Vitals and nursing note reviewed.   Constitutional:       General: She is not in acute distress.     Appearance: Normal appearance. She is not ill-appearing.   HENT:      Head: Normocephalic and atraumatic.      Right Ear: Tympanic membrane normal.      Left Ear: Tympanic membrane normal.      Nose: Nose normal.      Mouth/Throat:      Mouth: Mucous membranes are moist.   Eyes:      Conjunctiva/sclera: Conjunctivae normal.   Cardiovascular:      Rate and  Rhythm: Normal rate.      Heart sounds: Normal heart sounds.   Pulmonary:      Effort: Pulmonary effort is normal.   Abdominal:      General: Abdomen is flat.      Palpations: Abdomen is soft.      Tenderness: There is no abdominal tenderness. There is no right CVA tenderness or left CVA tenderness.   Musculoskeletal:         General: Normal range of motion.      Cervical back: Normal range of motion.   Skin:     General: Skin is warm and dry.      Capillary Refill: Capillary refill takes less than 2 seconds.   Neurological:      Mental Status: She is alert and oriented to person, place, and time.   Psychiatric:         Mood and Affect: Mood normal.         Behavior: Behavior normal.       Results for orders placed or performed in visit on 09/04/24   POCT Urinalysis   Result Value Ref Range    POC Color dark yellow Negative    POC Appearance cloudy Negative    POC Glucose neg Negative mg/dL    POC Bilirubin neg Negative mg/dL    POC Ketones trace Negative mg/dL    POC Specific Gravity >=1.030 <1.005 - >1.030    POC Blood small Negative    POC Urine PH 6.0 5.0 - 8.0    POC Protein 30 Negative mg/dL    POC Urobiligen 1.0 Negative (0.2) mg/dL    POC Nitrites neg Negative    POC Leukocyte Esterase neg Negative       Assessment/Plan:       1. Painful urination  POCT Urinalysis    cefdinir (OMNICEF) 300 MG Cap    URINE CULTURE(NEW)      2. Acute nonintractable headache, unspecified headache type  ketorolac (Toradol) injection 30 mg    DISCONTINUED: SUMAtriptan Succinate (Imitrex) injection 6 mg        After assessment patient did have small amount of blood noted in UA.  Patient reports that dysuria has mildly subsided after using Macrobid but still has persistent symptoms.  At this time patient will be placed on 7-day course of cefdinir and urine will be sent for culture.  Patient has had intermittent headache for the past 10 days that has not gone away with the use of Tylenol.  Patient reports that she avoids taking  Motrin for longer periods of time due to heartburn.  Patient was provided a one-time dose of 30 mg of IM Toradol in office to help with headache.  Patient was instructed to continue use of Tylenol.  Patient instructed to monitor for any worsening signs and symptoms of any other concerns or no improvement on antibiotics after 2 or 3 days to return to urgent care or emergency department for further management.    Differential diagnosis, natural history, and supportive care discussed. We also reviewed side effects of medication including allergic response, GI upset, tendon injury, rash, sedation etc. Patient and/or guardian voices understanding.      Advised the patient to follow-up with the primary care physician for recheck, reevaluation, and consideration of further management.    I personally reviewed prior external notes and test results pertinent to today's visit as well as additional imaging and testing completed in clinic today.     Please note that this dictation was created using voice recognition software. I have made every reasonable attempt to correct obvious errors, but I expect that there are errors of grammar and possibly content that I did not discover before finalizing the note.    This note was electronically signed by HARJEET Faustin

## 2024-09-05 DIAGNOSIS — R30.9 PAINFUL URINATION: ICD-10-CM

## 2024-09-07 LAB
BACTERIA UR CULT: NORMAL
SIGNIFICANT IND 70042: NORMAL
SITE SITE: NORMAL
SOURCE SOURCE: NORMAL

## 2024-09-17 ENCOUNTER — OFFICE VISIT (OUTPATIENT)
Dept: URGENT CARE | Facility: PHYSICIAN GROUP | Age: 54
End: 2024-09-17
Payer: COMMERCIAL

## 2024-09-17 ENCOUNTER — APPOINTMENT (OUTPATIENT)
Dept: RADIOLOGY | Facility: MEDICAL CENTER | Age: 54
End: 2024-09-17
Attending: EMERGENCY MEDICINE
Payer: COMMERCIAL

## 2024-09-17 ENCOUNTER — HOSPITAL ENCOUNTER (EMERGENCY)
Facility: MEDICAL CENTER | Age: 54
End: 2024-09-17
Attending: EMERGENCY MEDICINE
Payer: COMMERCIAL

## 2024-09-17 ENCOUNTER — APPOINTMENT (OUTPATIENT)
Dept: RADIOLOGY | Facility: IMAGING CENTER | Age: 54
End: 2024-09-17
Payer: COMMERCIAL

## 2024-09-17 ENCOUNTER — APPOINTMENT (OUTPATIENT)
Dept: RADIOLOGY | Facility: MEDICAL CENTER | Age: 54
End: 2024-09-17
Payer: COMMERCIAL

## 2024-09-17 VITALS
WEIGHT: 182 LBS | RESPIRATION RATE: 16 BRPM | BODY MASS INDEX: 35.73 KG/M2 | HEART RATE: 105 BPM | DIASTOLIC BLOOD PRESSURE: 72 MMHG | SYSTOLIC BLOOD PRESSURE: 112 MMHG | HEIGHT: 60 IN | OXYGEN SATURATION: 91 % | TEMPERATURE: 97 F

## 2024-09-17 VITALS
WEIGHT: 183 LBS | BODY MASS INDEX: 35.93 KG/M2 | HEIGHT: 60 IN | TEMPERATURE: 98.6 F | OXYGEN SATURATION: 93 % | HEART RATE: 111 BPM | DIASTOLIC BLOOD PRESSURE: 92 MMHG | SYSTOLIC BLOOD PRESSURE: 181 MMHG | RESPIRATION RATE: 12 BRPM

## 2024-09-17 DIAGNOSIS — R00.0 TACHYCARDIA: ICD-10-CM

## 2024-09-17 DIAGNOSIS — R06.02 SOB (SHORTNESS OF BREATH): ICD-10-CM

## 2024-09-17 DIAGNOSIS — R07.2 SUBSTERNAL CHEST PAIN: ICD-10-CM

## 2024-09-17 DIAGNOSIS — J22 LOWER RESP. TRACT INFECTION: ICD-10-CM

## 2024-09-17 DIAGNOSIS — R07.9 CHEST PAIN, UNSPECIFIED TYPE: ICD-10-CM

## 2024-09-17 DIAGNOSIS — R51.9 ACUTE INTRACTABLE HEADACHE, UNSPECIFIED HEADACHE TYPE: ICD-10-CM

## 2024-09-17 LAB
ALBUMIN SERPL BCP-MCNC: 3.9 G/DL (ref 3.2–4.9)
ALBUMIN/GLOB SERPL: 0.9 G/DL
ALP SERPL-CCNC: 133 U/L (ref 30–99)
ALT SERPL-CCNC: 7 U/L (ref 2–50)
ANION GAP SERPL CALC-SCNC: 14 MMOL/L (ref 7–16)
AST SERPL-CCNC: 26 U/L (ref 12–45)
BASOPHILS # BLD AUTO: 0.7 % (ref 0–1.8)
BASOPHILS # BLD: 0.06 K/UL (ref 0–0.12)
BILIRUB SERPL-MCNC: 0.3 MG/DL (ref 0.1–1.5)
BUN SERPL-MCNC: 9 MG/DL (ref 8–22)
CALCIUM ALBUM COR SERPL-MCNC: 9.5 MG/DL (ref 8.5–10.5)
CALCIUM SERPL-MCNC: 9.4 MG/DL (ref 8.5–10.5)
CHLORIDE SERPL-SCNC: 102 MMOL/L (ref 96–112)
CO2 SERPL-SCNC: 22 MMOL/L (ref 20–33)
CREAT SERPL-MCNC: 0.62 MG/DL (ref 0.5–1.4)
EKG IMPRESSION: NORMAL
EOSINOPHIL # BLD AUTO: 0.27 K/UL (ref 0–0.51)
EOSINOPHIL NFR BLD: 3.2 % (ref 0–6.9)
ERYTHROCYTE [DISTWIDTH] IN BLOOD BY AUTOMATED COUNT: 46.6 FL (ref 35.9–50)
GFR SERPLBLD CREATININE-BSD FMLA CKD-EPI: 106 ML/MIN/1.73 M 2
GLOBULIN SER CALC-MCNC: 4.2 G/DL (ref 1.9–3.5)
GLUCOSE SERPL-MCNC: 107 MG/DL (ref 65–99)
HCG SERPL QL: NEGATIVE
HCT VFR BLD AUTO: 39.8 % (ref 37–47)
HGB BLD-MCNC: 12.5 G/DL (ref 12–16)
IMM GRANULOCYTES # BLD AUTO: 0.04 K/UL (ref 0–0.11)
IMM GRANULOCYTES NFR BLD AUTO: 0.5 % (ref 0–0.9)
LYMPHOCYTES # BLD AUTO: 2.36 K/UL (ref 1–4.8)
LYMPHOCYTES NFR BLD: 27.9 % (ref 22–41)
MCH RBC QN AUTO: 26.2 PG (ref 27–33)
MCHC RBC AUTO-ENTMCNC: 31.4 G/DL (ref 32.2–35.5)
MCV RBC AUTO: 83.4 FL (ref 81.4–97.8)
MONOCYTES # BLD AUTO: 0.59 K/UL (ref 0–0.85)
MONOCYTES NFR BLD AUTO: 7 % (ref 0–13.4)
NEUTROPHILS # BLD AUTO: 5.13 K/UL (ref 1.82–7.42)
NEUTROPHILS NFR BLD: 60.7 % (ref 44–72)
NRBC # BLD AUTO: 0 K/UL
NRBC BLD-RTO: 0 /100 WBC (ref 0–0.2)
PLATELET # BLD AUTO: 506 K/UL (ref 164–446)
PMV BLD AUTO: 9.8 FL (ref 9–12.9)
POTASSIUM SERPL-SCNC: 3.9 MMOL/L (ref 3.6–5.5)
PROT SERPL-MCNC: 8.1 G/DL (ref 6–8.2)
RBC # BLD AUTO: 4.77 M/UL (ref 4.2–5.4)
SODIUM SERPL-SCNC: 138 MMOL/L (ref 135–145)
TROPONIN T SERPL-MCNC: 7 NG/L (ref 6–19)
TROPONIN T SERPL-MCNC: 9 NG/L (ref 6–19)
WBC # BLD AUTO: 8.5 K/UL (ref 4.8–10.8)

## 2024-09-17 PROCEDURE — 93000 ELECTROCARDIOGRAM COMPLETE: CPT

## 2024-09-17 PROCEDURE — 93005 ELECTROCARDIOGRAM TRACING: CPT

## 2024-09-17 PROCEDURE — 93005 ELECTROCARDIOGRAM TRACING: CPT | Performed by: EMERGENCY MEDICINE

## 2024-09-17 PROCEDURE — 71046 X-RAY EXAM CHEST 2 VIEWS: CPT | Mod: TC,FY | Performed by: RADIOLOGY

## 2024-09-17 PROCEDURE — 700117 HCHG RX CONTRAST REV CODE 255: Performed by: EMERGENCY MEDICINE

## 2024-09-17 PROCEDURE — 85025 COMPLETE CBC W/AUTO DIFF WBC: CPT

## 2024-09-17 PROCEDURE — 700105 HCHG RX REV CODE 258: Performed by: EMERGENCY MEDICINE

## 2024-09-17 PROCEDURE — 80053 COMPREHEN METABOLIC PANEL: CPT

## 2024-09-17 PROCEDURE — 99285 EMERGENCY DEPT VISIT HI MDM: CPT

## 2024-09-17 PROCEDURE — 84703 CHORIONIC GONADOTROPIN ASSAY: CPT

## 2024-09-17 PROCEDURE — 71275 CT ANGIOGRAPHY CHEST: CPT

## 2024-09-17 PROCEDURE — 3078F DIAST BP <80 MM HG: CPT

## 2024-09-17 PROCEDURE — 3074F SYST BP LT 130 MM HG: CPT

## 2024-09-17 PROCEDURE — 36415 COLL VENOUS BLD VENIPUNCTURE: CPT

## 2024-09-17 PROCEDURE — 99213 OFFICE O/P EST LOW 20 MIN: CPT

## 2024-09-17 PROCEDURE — 84484 ASSAY OF TROPONIN QUANT: CPT | Mod: 91

## 2024-09-17 RX ORDER — SODIUM CHLORIDE, SODIUM LACTATE, POTASSIUM CHLORIDE, CALCIUM CHLORIDE 600; 310; 30; 20 MG/100ML; MG/100ML; MG/100ML; MG/100ML
1000 INJECTION, SOLUTION INTRAVENOUS ONCE
Status: COMPLETED | OUTPATIENT
Start: 2024-09-17 | End: 2024-09-17

## 2024-09-17 RX ORDER — DOXYCYCLINE 100 MG/1
100 CAPSULE ORAL 2 TIMES DAILY
Qty: 10 CAPSULE | Refills: 0 | Status: ACTIVE | OUTPATIENT
Start: 2024-09-17 | End: 2024-09-22

## 2024-09-17 RX ADMIN — IOHEXOL 43 ML: 350 INJECTION, SOLUTION INTRAVENOUS at 19:45

## 2024-09-17 RX ADMIN — SODIUM CHLORIDE, POTASSIUM CHLORIDE, SODIUM LACTATE AND CALCIUM CHLORIDE 1000 ML: 600; 310; 30; 20 INJECTION, SOLUTION INTRAVENOUS at 17:32

## 2024-09-17 ASSESSMENT — FIBROSIS 4 INDEX
FIB4 SCORE: 1.08
FIB4 SCORE: 1.08

## 2024-09-17 NOTE — ED TRIAGE NOTES
Chief Complaint   Patient presents with    Chest Pain     Mid sternal/epigastric pain x2 weeks, worsening last night. Pt reports 7/10 throbbing achy pain. Denies cardiac history. Increased pain with inspiration.       Patient ambulatory to triage for above complaint. Patient A&Ox4, GCS 15, patient speaking in full sentences. Equal and unlabored respirations. Patient educated on triage process and encouraged to notify staff if condition worsens. Appropriate protocols ordered. Patient returned to the lobby in stable condition.

## 2024-09-17 NOTE — PROGRESS NOTES
Chief Complaint   Patient presents with    Headache    Congestion    Shortness of Breath     Deep breathing is difficult . Sx 3 weeks, getting worse         Subjective:   HISTORY OF PRESENT ILLNESS: Nanda Gutierrez is a 53 y.o. female who presents for SOB and substernal chest pain x 2-3 weeks and progressively getting worse.  Reports pain when she takes a deep breath and getting winded easily with her normal daily activities.  Denies significant URI symptoms leading up to the SOB.  No hx of blood clots   Patient denies exertional chest pain, diaphoresis or n/v.  She also reports relentless headache for about the same timeframe.  She was seen for this headache on the 9/4 along with a UTI and given toradol    Medications, Allergies, current problem list, Social and Family history reviewed today in Epic.     Objective:     /72   Pulse (!) 105   Temp 36.1 °C (97 °F) (Temporal)   Resp 16   Ht 1.524 m (5')   Wt 82.6 kg (182 lb)   SpO2 91%     Physical Exam  Vitals reviewed.   Constitutional:       General: She is not in acute distress.     Appearance: Normal appearance. She is not ill-appearing or toxic-appearing.   HENT:      Head: Normocephalic and atraumatic.      Right Ear: Tympanic membrane normal.      Left Ear: Tympanic membrane normal.      Nose: No congestion or rhinorrhea.      Mouth/Throat:      Mouth: Mucous membranes are moist.   Eyes:      Pupils: Pupils are equal, round, and reactive to light.   Cardiovascular:      Rate and Rhythm: Normal rate and regular rhythm.      Pulses: Normal pulses.      Heart sounds: Normal heart sounds, S1 normal and S2 normal.   Pulmonary:      Effort: Pulmonary effort is normal. No respiratory distress.      Breath sounds: Normal breath sounds. No stridor. No wheezing or rhonchi.   Abdominal:      General: Abdomen is flat. Bowel sounds are normal. There is no distension.      Palpations: Abdomen is soft.   Musculoskeletal:      Cervical back: Normal range of motion.    Skin:     General: Skin is warm and dry.   Neurological:      Mental Status: She is alert and oriented to person, place, and time.   Psychiatric:         Mood and Affect: Mood normal.          Assessment/Plan:     Diagnosis and associated orders    I personally reviewed prior external notes and test results pertinent to today's visit.     1. SOB (shortness of breath)  DX-CHEST-2 VIEWS    EKG - Clinic Performed      2. Substernal chest pain  DX-CHEST-2 VIEWS    EKG - Clinic Performed      3. Tachycardia  DX-CHEST-2 VIEWS    EKG - Clinic Performed      4. Acute intractable headache, unspecified headache type  DX-CHEST-2 VIEWS    EKG - Clinic Performed            IMPRESSION:  Pt presents with progressive dyspnea with exertion and substernal chest pressure x2-3 weeks.  Also reports intractable headache for the same amount of time.  She is tachycardic 105, oxygen on room air is 91%, EKG shows no acute ichemic changes, no active disease on chest xray.  Concerned for possible PE or other serious cause of her SOB and chest pain.  Have referred her to the ED for further eval and management    Please note that this dictation was created using voice recognition software. I have made a reasonable attempt to correct obvious errors, but I expect that there are errors of grammar and possibly content that I did not discover before finalizing the note.    This note was electronically signed by HARJEET Thakkar

## 2024-09-18 NOTE — ED PROVIDER NOTES
"ED Provider Note    CHIEF COMPLAINT  Chief Complaint   Patient presents with    Chest Pain     Mid sternal/epigastric pain x2 weeks, worsening last night. Pt reports 7/10 throbbing achy pain. Denies cardiac history. Increased pain with inspiration.        EXTERNAL RECORDS REVIEWED  Patient last encounter was in the family medicine clinic, September 17 of this year she was seen for shortness of breath, substernal chest pain and tachycardia as well as headache.    Prior to that patient was seen September 4, 2020 for for painful urination and headache.    Prior to that patient was seen in the family medicine clinic to establish care.  History of heartburn and left ear itching.  Additional other past medical history includes GERD, prediabetes, dyslipidemia, colon cancer screening, obesity eczema and chronic tension type headaches.    HPI/ROS  LIMITATION TO HISTORY   Select: : None  OUTSIDE HISTORIAN(S):  Parent mother and father    Nanda Gutierrez is a 53 y.o. female who presents to the emergency department accompanied by her parents.  She was seen in the [place] urgent care prior to arrival.  She complains of shortness of breath and chest pain.  She states has been going on for 2 weeks.  She knows of no specific triggers.  She has had mild nasal congestion which she contributes to seasonal allergies.  No cough.  The pain is localized to her lower sternum, epigastric area.  She denies a fever.  She does report decreased appetite because things \"just do not taste good\".  She states 3 weeks ago she was treated for UTI with antibiotics and those symptoms have resolved.  UTI did trigger a headache which she has chronically and it has been persistent.  This is something she deals with frequently.  She has no history of hypertension, diabetes or high cholesterol.  She does not smoke.  She takes occasional aspirin for headaches.  She denies any recent travel injuries.  No recent surgeries.  She has no history of personal " thromboembolic disease.  No calf pain.  No hemoptysis.  She is short of breath and mildly tachycardic in the urgent care advised her that she should come for CT chest evaluation, screening for PE.    PAST MEDICAL HISTORY   has a past medical history of Anemia, Chronic headaches (4/30/2014), Hyperlipidemia, Thyroid nodule, and Urinary tract infection, site not specified.    SURGICAL HISTORY   has a past surgical history that includes eye surgery and tonsillectomy.    FAMILY HISTORY  Family History   Problem Relation Age of Onset    Cancer Maternal Grandmother 56        pancreatic    Heart Disease Maternal Grandfather 40        MI    Diabetes Paternal Grandmother     Diabetes Mother     Stroke Mother     Heart Disease Mother     Stroke Father         possibly not a stroke? possibly TIA?     Depression Child        SOCIAL HISTORY  Social History     Tobacco Use    Smoking status: Never    Smokeless tobacco: Never   Substance and Sexual Activity    Alcohol use: No    Drug use: No    Sexual activity: Yes     Partners: Male     Comment: with one male partner since around 2004       CURRENT MEDICATIONS  Home Medications       Reviewed by Leeann Anderson R.N. (Registered Nurse) on 09/17/24 at 1623  Med List Status: Partial     Medication Last Dose Status   ibuprofen (MOTRIN) 800 MG Tab  Active                    ALLERGIES  Allergies   Allergen Reactions    Pcn [Penicillins]      Childhood reaction       PHYSICAL EXAM  VITAL SIGNS: BP (!) 181/92   Pulse (!) 111   Temp 37 °C (98.6 °F) (Temporal)   Resp 12   Ht 1.524 m (5')   Wt 83 kg (183 lb)   SpO2 93%   BMI 35.74 kg/m²    Vitals reviewed.  Constitutional: Patient is oriented to person, place, and time. Appears well-developed and well-nourished. No distress.    Head: Normocephalic and atraumatic.   Mouth/Throat: Oropharynx is clear  Eyes: Conjunctivae are normal.   Neck: Normal range of motion. Neck supple.   Cardiovascular: Normal rate, regular rhythm and  normal heart sounds. Normal peripheral pulses.  Pulmonary/Chest: Effort normal and breath sounds normal. No respiratory distress, no wheezes, rhonchi, or rales. Mid lower anterior chest wall tenderness.  Abdominal: Soft. Bowel sounds are normal. There is no tenderness, rebound.  Musculoskeletal: No edema and no tenderness.   Lymphadenopathy: No cervical adenopathy.   Neurological: No cranial nerve deficits. Normal gait. No focal deficits.   Skin: Skin is warm and dry. No erythema. No pallor.   Psychiatric: Patient has a normal mood and affect.     EKG/LABS  Results for orders placed or performed during the hospital encounter of 09/17/24   CBC with Differential   Result Value Ref Range    WBC 8.5 4.8 - 10.8 K/uL    RBC 4.77 4.20 - 5.40 M/uL    Hemoglobin 12.5 12.0 - 16.0 g/dL    Hematocrit 39.8 37.0 - 47.0 %    MCV 83.4 81.4 - 97.8 fL    MCH 26.2 (L) 27.0 - 33.0 pg    MCHC 31.4 (L) 32.2 - 35.5 g/dL    RDW 46.6 35.9 - 50.0 fL    Platelet Count 506 (H) 164 - 446 K/uL    MPV 9.8 9.0 - 12.9 fL    Neutrophils-Polys 60.70 44.00 - 72.00 %    Lymphocytes 27.90 22.00 - 41.00 %    Monocytes 7.00 0.00 - 13.40 %    Eosinophils 3.20 0.00 - 6.90 %    Basophils 0.70 0.00 - 1.80 %    Immature Granulocytes 0.50 0.00 - 0.90 %    Nucleated RBC 0.00 0.00 - 0.20 /100 WBC    Neutrophils (Absolute) 5.13 1.82 - 7.42 K/uL    Lymphs (Absolute) 2.36 1.00 - 4.80 K/uL    Monos (Absolute) 0.59 0.00 - 0.85 K/uL    Eos (Absolute) 0.27 0.00 - 0.51 K/uL    Baso (Absolute) 0.06 0.00 - 0.12 K/uL    Immature Granulocytes (abs) 0.04 0.00 - 0.11 K/uL    NRBC (Absolute) 0.00 K/uL   Complete Metabolic Panel (CMP)   Result Value Ref Range    Sodium 138 135 - 145 mmol/L    Potassium 3.9 3.6 - 5.5 mmol/L    Chloride 102 96 - 112 mmol/L    Co2 22 20 - 33 mmol/L    Anion Gap 14.0 7.0 - 16.0    Glucose 107 (H) 65 - 99 mg/dL    Bun 9 8 - 22 mg/dL    Creatinine 0.62 0.50 - 1.40 mg/dL    Calcium 9.4 8.5 - 10.5 mg/dL    Correct Calcium 9.5 8.5 - 10.5 mg/dL     AST(SGOT) 26 12 - 45 U/L    ALT(SGPT) 7 2 - 50 U/L    Alkaline Phosphatase 133 (H) 30 - 99 U/L    Total Bilirubin 0.3 0.1 - 1.5 mg/dL    Albumin 3.9 3.2 - 4.9 g/dL    Total Protein 8.1 6.0 - 8.2 g/dL    Globulin 4.2 (H) 1.9 - 3.5 g/dL    A-G Ratio 0.9 g/dL   Troponins NOW   Result Value Ref Range    Troponin T 7 6 - 19 ng/L   Troponins in two (2) hours   Result Value Ref Range    Troponin T 9 6 - 19 ng/L   HCG Qual Serum   Result Value Ref Range    Beta-Hcg Qualitative Serum Negative Negative   ESTIMATED GFR   Result Value Ref Range    GFR (CKD-EPI) 106 >60 mL/min/1.73 m 2   EKG (NOW)   Result Value Ref Range    Report       [facility] Emergency Dept.    Test Date:  2024  Pt Name:    REJI GREGORY                   Department: ER  MRN:        [id number]                      Room:  Gender:     Female                       Technician: [id number]  :        1970                   Requested By:ER TRIAGE PROTOCOL  Order #:    [id number]                    Reading MD: CONSTANZA DENT DO    Measurements  Intervals                                Axis  Rate:       103                          P:          49  ME:         162                          QRS:        8  QRSD:       99                           T:          -1  QT:         357  QTc:        468    Interpretive Statements  Sinus tachycardia  Probable left atrial enlargement  Anterior infarct, old  No previous ECG available for comparison  Electronically Signed On 2024 17:00:29 PDT by CONSTANZA DENT DO       I have independently interpreted this EKG    RADIOLOGY/PROCEDURES   I have independently interpreted the diagnostic imaging associated with this visit and am waiting the final reading from the radiologist.   My preliminary interpretation is as follows: No PE. No lobar infiltrate.     Radiologist interpretation:  CT-CTA CHEST PULMONARY ARTERY W/ RECONS   Final Result         1.  Bilateral groundglass opacities, scattered small nodules and  irregular peripheral subpleural opacities are suspicious for an atypical infectious or inflammatory etiology.   2.  No evidence of pulmonary embolism.   3.  Borderline enlarged lymph nodes are nonspecific but could be reactive.   4.  Atherosclerosis.      Fleischner Society pulmonary nodule recommendations:   Low Risk: No routine follow-up      High Risk: Optional CT at 12 months      Comments: Use most suspicious nodule as guide to management. Follow-up intervals may vary according to size and risk.      Low Risk - Minimal or absent history of smoking and of other known risk factors.      High Risk - History of smoking or of other known risk factors.      Note: These recommendations do not apply to lung cancer screening, patients with immunosuppression, or patients with known primary cancer.      Fleischner Society 2017 Guidelines for Management of Incidentally Detected Pulmonary Nodules in Adults          COURSE & MEDICAL DECISION MAKING    ASSESSMENT, COURSE AND PLAN  Care Narrative:   This is a pleasant and previously healthy 53-year-old female who presents with epigastric pain, lower sternal border chest pain.  She is tachycardic so technically not PE RC negative.  She was sent in for this purpose.  EKG is reassuring.  She had a chest x-ray, 2 view, as an outpatient this morning and this was reviewed.  This will not be repeated.  Labs pending.    8:29 PM patient is reevaluated at the bedside.  Parents and  at the bedside.  We discussed labs and CT imaging which is reassuring.  There are some inflammatory changes at the bilateral bases, suggestive of atypical infection or inflammation, I have discussed these findings with the patient and I have suggested a course of antibiotics.  No evidence of pulmonary embolism.  Labs are reassuring including a troponin.  Patient is very much reassured by this.  She is discharged home with a course of doxycycline.  She is well-appearing overall.  She is discharged in  stable condition.    Hydration: Based on the patient's presentation of Tachycardia the patient was given IV fluids. IV Hydration was used because oral hydration was not adequate alone. Upon recheck following hydration, the patient was improved.    ADDITIONAL PROBLEMS MANAGED    DISPOSITION AND DISCUSSIONS  I have discussed management of the patient with the following physicians and ZIA's:  None    Discussion of management with other QHP or appropriate source(s): None     Escalation of care considered, and ultimately not performed: None    Barriers to care at this time, including but not limited to: None.     Decision tools and prescription drugs considered including, but not limited to:  None.    FINAL DIAGNOSIS  1. Chest pain, unspecified type    2. Lower resp. tract infection         Electronically signed by: Ruby Choi D.O., 9/17/2024 5:21 PM

## 2024-09-18 NOTE — ED NOTES
Pt discharged to home. Discharge paperwork provided. Education provided by ERP. Reinforced discharge instructions.  Pt was given follow up instructions and prescriptions.  Pt verbalized understanding of all instructions for discharge.   Patient went out of the ER ambulatory with steady gait accompanied by spouse., alert and oriented x 4, with all belongings.

## 2024-09-26 ENCOUNTER — OFFICE VISIT (OUTPATIENT)
Dept: MEDICAL GROUP | Facility: MEDICAL CENTER | Age: 54
End: 2024-09-26
Payer: COMMERCIAL

## 2024-09-26 VITALS
BODY MASS INDEX: 35.92 KG/M2 | WEIGHT: 182.98 LBS | DIASTOLIC BLOOD PRESSURE: 68 MMHG | HEIGHT: 60 IN | OXYGEN SATURATION: 94 % | SYSTOLIC BLOOD PRESSURE: 112 MMHG | RESPIRATION RATE: 18 BRPM | HEART RATE: 106 BPM | TEMPERATURE: 98 F

## 2024-09-26 DIAGNOSIS — E04.1 THYROID NODULE: ICD-10-CM

## 2024-09-26 DIAGNOSIS — Z12.11 SCREENING FOR COLORECTAL CANCER: ICD-10-CM

## 2024-09-26 DIAGNOSIS — Z00.00 ENCOUNTER FOR PREVENTIVE CARE: ICD-10-CM

## 2024-09-26 DIAGNOSIS — R73.03 PREDIABETES: ICD-10-CM

## 2024-09-26 DIAGNOSIS — E78.5 DYSLIPIDEMIA: ICD-10-CM

## 2024-09-26 DIAGNOSIS — Z12.12 SCREENING FOR COLORECTAL CANCER: ICD-10-CM

## 2024-09-26 DIAGNOSIS — J18.9 PNEUMONIA OF BOTH LUNGS DUE TO INFECTIOUS ORGANISM, UNSPECIFIED PART OF LUNG: ICD-10-CM

## 2024-09-26 DIAGNOSIS — R23.2 HOT FLASHES: ICD-10-CM

## 2024-09-26 PROCEDURE — 3074F SYST BP LT 130 MM HG: CPT | Performed by: STUDENT IN AN ORGANIZED HEALTH CARE EDUCATION/TRAINING PROGRAM

## 2024-09-26 PROCEDURE — 99215 OFFICE O/P EST HI 40 MIN: CPT | Performed by: STUDENT IN AN ORGANIZED HEALTH CARE EDUCATION/TRAINING PROGRAM

## 2024-09-26 PROCEDURE — 3078F DIAST BP <80 MM HG: CPT | Performed by: STUDENT IN AN ORGANIZED HEALTH CARE EDUCATION/TRAINING PROGRAM

## 2024-09-26 RX ORDER — VENLAFAXINE HYDROCHLORIDE 37.5 MG/1
37.5 CAPSULE, EXTENDED RELEASE ORAL DAILY
Qty: 30 CAPSULE | Refills: 3 | Status: SHIPPED | OUTPATIENT
Start: 2024-09-26

## 2024-09-26 RX ORDER — ALBUTEROL SULFATE 90 UG/1
2 INHALANT RESPIRATORY (INHALATION) EVERY 4 HOURS PRN
Qty: 1 EACH | Refills: 1 | Status: SHIPPED | OUTPATIENT
Start: 2024-09-26 | End: 2024-10-26

## 2024-09-26 RX ORDER — DOXYCYCLINE HYCLATE 100 MG
100 TABLET ORAL 2 TIMES DAILY
Qty: 10 TABLET | Refills: 0 | Status: SHIPPED | OUTPATIENT
Start: 2024-09-26 | End: 2024-10-01

## 2024-09-26 ASSESSMENT — PATIENT HEALTH QUESTIONNAIRE - PHQ9: CLINICAL INTERPRETATION OF PHQ2 SCORE: 0

## 2024-09-26 ASSESSMENT — FIBROSIS 4 INDEX: FIB4 SCORE: 1.03

## 2024-09-26 NOTE — ASSESSMENT & PLAN NOTE
-An additional 5-day course of doxycycline will be initiated. An albuterol inhaler will be provided for use as needed, up to four times daily, to help with shortness of breath and cough.  -A repeat chest x-ray will be considered if her cough does not resolve in 4 to 6 weeks.

## 2024-09-26 NOTE — ASSESSMENT & PLAN NOTE
IMPRESSION:     US 9/20/23  Stable mildly suspicious nodule at the posterior LEFT mid thyroid lobe measuring 1 cm (TR 3).  Parathyroid nodule also a consideration.     ACR TI-RADS Recommendations  TR3 (less than 1.5 cm) - no follow-up ultrasound or FNA recommended.

## 2024-09-26 NOTE — PROGRESS NOTES
Verbal consent was acquired by the patient to use [employer] ambient listening note generation during this visit     Subjective:     HPI:   History of Present Illness  The patient is a 53-year-old female who presents for establishing care.    She was diagnosed with pneumonia on 09/17/2023 and completed a 5-day course of doxycycline last Sunday. Despite this, she still experiences difficulty in taking deep breaths and has developed a cough. She reports no fever. Her cough became productive today, accompanied by clear nasal discharge. She has not tried any over-the-counter cough medications as her cough only started last night.      She also experiences hot flashes, typically occurring around 3 or 4 in the morning. She underwent NovaSure ablation at the age of 40 due to severe anemia caused by heavy menstrual periods. Since then, she has not menstruated for almost 14 years. She still has her ovaries and experiences all the symptoms of menstruation except for the bleeding. She has no history of anxiety.    She had a thyroid nodule, which was found to be stable upon examination a year ago.           Objective:     Exam:  /68   Pulse (!) 106   Temp 36.7 °C (98 °F)   Resp 18   Ht 1.524 m (5')   Wt 83 kg (182 lb 15.7 oz)   SpO2 94%   BMI 35.74 kg/m²  Body mass index is 35.74 kg/m².    Physical Exam  Pulmonary:      Comments: occassional crackles b/l      Lab Results   Component Value Date/Time    SODIUM 138 09/17/2024 04:28 PM    POTASSIUM 3.9 09/17/2024 04:28 PM    CHLORIDE 102 09/17/2024 04:28 PM    CO2 22 09/17/2024 04:28 PM    GLUCOSE 107 (H) 09/17/2024 04:28 PM    BUN 9 09/17/2024 04:28 PM    CREATININE 0.62 09/17/2024 04:28 PM          Assessment & Plan:     Assessment & Plan      Problem List Items Addressed This Visit       Dyslipidemia    Relevant Orders    Lipid Profile    Prediabetes    Relevant Orders    HEMOGLOBIN A1C    Thyroid nodule     IMPRESSION:     US 9/20/23  Stable mildly suspicious  nodule at the posterior LEFT mid thyroid lobe measuring 1 cm (TR 3).  Parathyroid nodule also a consideration.     ACR TI-RADS Recommendations  TR3 (less than 1.5 cm) - no follow-up ultrasound or FNA recommended.            Relevant Orders    TSH    Pneumonia of both lungs due to infectious organism     -An additional 5-day course of doxycycline will be initiated. An albuterol inhaler will be provided for use as needed, up to four times daily, to help with shortness of breath and cough.  -A repeat chest x-ray will be considered if her cough does not resolve in 4 to 6 weeks.         Relevant Medications    doxycycline (VIBRAMYCIN) 100 MG Tab    albuterol 108 (90 Base) MCG/ACT Aero Soln inhalation aerosol    Hot flashes     -Plan to start venlafaxine 37.5 daily and monitor for response          Other Visit Diagnoses       Screening for colorectal cancer        Relevant Orders    Cologuard® colon cancer screening    Encounter for preventive care        Relevant Orders    Comp Metabolic Panel    CBC WITH DIFFERENTIAL                Return in about 6 weeks (around 11/7/2024) for Labfollowup,cough.    Please note that this dictation was created using voice recognition software. I have made every reasonable attempt to correct obvious errors, but I expect that there are errors of grammar and possibly content that I did not discover before finalizing the note.

## 2024-11-12 ENCOUNTER — HOSPITAL ENCOUNTER (OUTPATIENT)
Dept: LAB | Facility: MEDICAL CENTER | Age: 54
End: 2024-11-12
Attending: STUDENT IN AN ORGANIZED HEALTH CARE EDUCATION/TRAINING PROGRAM
Payer: COMMERCIAL

## 2024-11-12 DIAGNOSIS — E04.1 THYROID NODULE: ICD-10-CM

## 2024-11-12 DIAGNOSIS — Z00.00 ENCOUNTER FOR PREVENTIVE CARE: ICD-10-CM

## 2024-11-12 DIAGNOSIS — E78.5 DYSLIPIDEMIA: ICD-10-CM

## 2024-11-12 DIAGNOSIS — R73.03 PREDIABETES: ICD-10-CM

## 2024-11-12 LAB
BASOPHILS # BLD AUTO: 0.7 % (ref 0–1.8)
BASOPHILS # BLD: 0.05 K/UL (ref 0–0.12)
EOSINOPHIL # BLD AUTO: 0.23 K/UL (ref 0–0.51)
EOSINOPHIL NFR BLD: 3.3 % (ref 0–6.9)
ERYTHROCYTE [DISTWIDTH] IN BLOOD BY AUTOMATED COUNT: 45.2 FL (ref 35.9–50)
EST. AVERAGE GLUCOSE BLD GHB EST-MCNC: 126 MG/DL
HBA1C MFR BLD: 6 % (ref 4–5.6)
HCT VFR BLD AUTO: 41.7 % (ref 37–47)
HGB BLD-MCNC: 13.2 G/DL (ref 12–16)
IMM GRANULOCYTES # BLD AUTO: 0.01 K/UL (ref 0–0.11)
IMM GRANULOCYTES NFR BLD AUTO: 0.1 % (ref 0–0.9)
LYMPHOCYTES # BLD AUTO: 2.75 K/UL (ref 1–4.8)
LYMPHOCYTES NFR BLD: 39.3 % (ref 22–41)
MCH RBC QN AUTO: 26.5 PG (ref 27–33)
MCHC RBC AUTO-ENTMCNC: 31.7 G/DL (ref 32.2–35.5)
MCV RBC AUTO: 83.7 FL (ref 81.4–97.8)
MONOCYTES # BLD AUTO: 0.56 K/UL (ref 0–0.85)
MONOCYTES NFR BLD AUTO: 8 % (ref 0–13.4)
NEUTROPHILS # BLD AUTO: 3.4 K/UL (ref 1.82–7.42)
NEUTROPHILS NFR BLD: 48.6 % (ref 44–72)
NRBC # BLD AUTO: 0 K/UL
NRBC BLD-RTO: 0 /100 WBC (ref 0–0.2)
PLATELET # BLD AUTO: 328 K/UL (ref 164–446)
PMV BLD AUTO: 11.5 FL (ref 9–12.9)
RBC # BLD AUTO: 4.98 M/UL (ref 4.2–5.4)
WBC # BLD AUTO: 7 K/UL (ref 4.8–10.8)

## 2024-11-12 PROCEDURE — 80053 COMPREHEN METABOLIC PANEL: CPT

## 2024-11-12 PROCEDURE — 83036 HEMOGLOBIN GLYCOSYLATED A1C: CPT

## 2024-11-12 PROCEDURE — 84443 ASSAY THYROID STIM HORMONE: CPT

## 2024-11-12 PROCEDURE — 36415 COLL VENOUS BLD VENIPUNCTURE: CPT

## 2024-11-12 PROCEDURE — 85025 COMPLETE CBC W/AUTO DIFF WBC: CPT

## 2024-11-12 PROCEDURE — 80061 LIPID PANEL: CPT

## 2024-11-13 LAB
ALBUMIN SERPL BCP-MCNC: 4 G/DL (ref 3.2–4.9)
ALBUMIN/GLOB SERPL: 0.9 G/DL
ALP SERPL-CCNC: 96 U/L (ref 30–99)
ALT SERPL-CCNC: 15 U/L (ref 2–50)
ANION GAP SERPL CALC-SCNC: 11 MMOL/L (ref 7–16)
AST SERPL-CCNC: 37 U/L (ref 12–45)
BILIRUB SERPL-MCNC: 0.4 MG/DL (ref 0.1–1.5)
BUN SERPL-MCNC: 12 MG/DL (ref 8–22)
CALCIUM ALBUM COR SERPL-MCNC: 9.6 MG/DL (ref 8.5–10.5)
CALCIUM SERPL-MCNC: 9.6 MG/DL (ref 8.5–10.5)
CHLORIDE SERPL-SCNC: 102 MMOL/L (ref 96–112)
CHOLEST SERPL-MCNC: 228 MG/DL (ref 100–199)
CO2 SERPL-SCNC: 24 MMOL/L (ref 20–33)
CREAT SERPL-MCNC: 0.86 MG/DL (ref 0.5–1.4)
FASTING STATUS PATIENT QL REPORTED: NORMAL
GFR SERPLBLD CREATININE-BSD FMLA CKD-EPI: 80 ML/MIN/1.73 M 2
GLOBULIN SER CALC-MCNC: 4.7 G/DL (ref 1.9–3.5)
GLUCOSE SERPL-MCNC: 112 MG/DL (ref 65–99)
HDLC SERPL-MCNC: 44 MG/DL
LDLC SERPL CALC-MCNC: 149 MG/DL
POTASSIUM SERPL-SCNC: 4.2 MMOL/L (ref 3.6–5.5)
PROT SERPL-MCNC: 8.7 G/DL (ref 6–8.2)
SODIUM SERPL-SCNC: 137 MMOL/L (ref 135–145)
TRIGL SERPL-MCNC: 177 MG/DL (ref 0–149)
TSH SERPL-ACNC: 2.74 UIU/ML (ref 0.35–5.5)

## 2024-11-19 ENCOUNTER — OFFICE VISIT (OUTPATIENT)
Dept: MEDICAL GROUP | Facility: MEDICAL CENTER | Age: 54
End: 2024-11-19
Payer: COMMERCIAL

## 2024-11-19 VITALS
SYSTOLIC BLOOD PRESSURE: 124 MMHG | BODY MASS INDEX: 36.12 KG/M2 | HEIGHT: 60 IN | TEMPERATURE: 98.7 F | OXYGEN SATURATION: 99 % | WEIGHT: 184 LBS | HEART RATE: 90 BPM | DIASTOLIC BLOOD PRESSURE: 68 MMHG

## 2024-11-19 DIAGNOSIS — E78.5 DYSLIPIDEMIA: ICD-10-CM

## 2024-11-19 DIAGNOSIS — R59.0 CERVICAL LYMPHADENOPATHY: ICD-10-CM

## 2024-11-19 DIAGNOSIS — R77.1 ELEVATED SERUM GLOBULIN LEVEL: ICD-10-CM

## 2024-11-19 PROCEDURE — 3074F SYST BP LT 130 MM HG: CPT | Performed by: STUDENT IN AN ORGANIZED HEALTH CARE EDUCATION/TRAINING PROGRAM

## 2024-11-19 PROCEDURE — 99214 OFFICE O/P EST MOD 30 MIN: CPT | Performed by: STUDENT IN AN ORGANIZED HEALTH CARE EDUCATION/TRAINING PROGRAM

## 2024-11-19 PROCEDURE — 3078F DIAST BP <80 MM HG: CPT | Performed by: STUDENT IN AN ORGANIZED HEALTH CARE EDUCATION/TRAINING PROGRAM

## 2024-11-19 RX ORDER — ATORVASTATIN CALCIUM 10 MG/1
10 TABLET, FILM COATED ORAL NIGHTLY
Qty: 90 TABLET | Refills: 0 | Status: SHIPPED | OUTPATIENT
Start: 2024-11-19 | End: 2025-02-17

## 2024-11-19 ASSESSMENT — ENCOUNTER SYMPTOMS
COUGH: 0
CHILLS: 0
PALPITATIONS: 0
FEVER: 0
HEMOPTYSIS: 0

## 2024-11-19 ASSESSMENT — FIBROSIS 4 INDEX: FIB4 SCORE: 1.57

## 2024-11-19 NOTE — PROGRESS NOTES
Nanda Gutierrez is a 54 y.o. old female  who is here for follow up of labs    Chief Complaint   Patient presents with    Follow-Up     Lab review         HPI    Patient reports that she developed loose stools on Venlafaxine and stopped the medication. She would like to hold off on medication at this time.Discussed HRT options. She noticed lump on her neck bilaterally few months ago which has increased in size slightly.        Review of Systems   Constitutional:  Negative for chills and fever.   Respiratory:  Negative for cough and hemoptysis.    Cardiovascular:  Negative for chest pain and palpitations.        She  has a past medical history of Anemia, Chronic headaches (4/30/2014), Hyperlipidemia, Thyroid nodule, and Urinary tract infection, site not specified.    She has no past medical history of Addisons disease ([facility]), Adrenal disorder (HCC), Allergy, Anxiety, Arrhythmia, Arthritis, Asthma, Cancer ([facility]), Cataract, CHF (congestive heart failure) ([facility]), Clotting disorder (HCC), COPD (chronic obstructive pulmonary disease) ([facility]), Cushings syndrome ([facility]), Depression, Diabetes ([facility]), Diabetic neuropathy ([facility]), GERD (gastroesophageal reflux disease), Glaucoma, Heart attack ([facility]), Heart murmur, HIV (human immunodeficiency virus infection) ([facility]), Hypertension, IBD (inflammatory bowel disease), Kidney disease, Meningitis, Migraine, Muscle disorder, Osteoporosis, Parathyroid disorder (HCC), Pituitary disease (HCC), Pulmonary emphysema (HCC), Seizure (HCC), Sickle cell disease (HCC), Stroke ([facility]), Substance abuse ([facility]), Tuberculosis, or Ulcer.  She  has a past surgical history that includes eye surgery and tonsillectomy.  Family History   Problem Relation Age of Onset    Diabetes Mother     Stroke Mother     Heart Disease Mother     Hypertension Mother     Stroke Father         possibly not a stroke? possibly TIA?     Cancer Maternal Grandmother 56        pancreatic    Heart Disease Maternal Grandfather 40        MI     Diabetes Paternal Grandmother     Depression Child      Social History     Tobacco Use    Smoking status: Never    Smokeless tobacco: Never   Vaping Use    Vaping status: Never Used   Substance Use Topics    Alcohol use: No    Drug use: No     Patient Active Problem List    Diagnosis Date Noted    Cervical lymphadenopathy 11/19/2024    Elevated serum globulin level 11/19/2024    Pneumonia of both lungs due to infectious organism 09/26/2024    Hot flashes 09/26/2024    NSAID long-term use 01/29/2019    Eczema 01/29/2019    Excessive daytime sleepiness 05/19/2017    Thyroid nodule 05/19/2017    Class 1 obesity due to excess calories without serious comorbidity with body mass index (BMI) of 34.0 to 34.9 in adult 07/07/2015    Prediabetes 12/30/2014    Thoracic back pain 12/22/2014    Vitamin D deficiency 05/10/2014    Dyslipidemia 05/10/2014    Chronic headaches 04/30/2014     Current Outpatient Medications   Medication Sig Dispense Refill    atorvastatin (LIPITOR) 10 MG Tab Take 1 Tablet by mouth every evening for 90 days. 90 Tablet 0    ibuprofen (MOTRIN) 800 MG Tab TAKE 1 TABLET BY MOUTH EVERY 8 HOURS AS NEEDED FOR MILD PAIN 90 Tab 0     No current facility-administered medications for this visit.    (including changes today)  Allergies: Pcn [penicillins]    /68 (BP Location: Left arm, Patient Position: Sitting, BP Cuff Size: Adult)   Pulse 90   Temp 37.1 °C (98.7 °F) (Temporal)   Ht 1.524 m (5')   Wt 83.5 kg (184 lb)   SpO2 99%      Physical Exam  Constitutional:       Appearance: Normal appearance.   Cardiovascular:      Rate and Rhythm: Normal rate and regular rhythm.      Pulses: Normal pulses.      Heart sounds: Normal heart sounds. No murmur heard.  Pulmonary:      Effort: Pulmonary effort is normal.      Breath sounds: Normal breath sounds.   Lymphadenopathy:      Cervical: Cervical adenopathy present.   Skin:     Coloration: Skin is not jaundiced.   Neurological:      General: No focal deficit  present.      Mental Status: She is alert and oriented to person, place, and time.        Lab Results   Component Value Date/Time    CHOLSTRLTOT 228 (H) 11/12/2024 0730    TRIGLYCERIDE 177 (H) 11/12/2024 0730    HDL 44 11/12/2024 0730     (H) 11/12/2024 0730     Lab Results   Component Value Date/Time    HBA1C 6.0 (H) 11/12/2024 07:30 AM          Assessment and plan:    Problem List Items Addressed This Visit       Dyslipidemia     -We will initiate statins and follow up repeat lipid panel  -Diet and lifestyle modifications discussed           Relevant Orders    Lipid Profile    Cervical lymphadenopathy     -Obtain US to evaluate LN architecture         Relevant Orders    US-SOFT TISSUES OF HEAD - NECK    Elevated serum globulin level     -Mild elevation in total protein and globulin levels.  -No anemia, CKD/ADE  -Monitor closely         Relevant Orders    Comp Metabolic Panel    CBC WITH DIFFERENTIAL          Return in about 6 months (around 5/19/2025), or if symptoms worsen or fail to improve, for Labfollowup.        Please note that this dictation was created using voice recognition software. I have made every reasonable attempt to correct obvious errors, but I expect that there are errors of grammar and possibly content that I did not discover before finalizing the note.

## 2024-11-20 NOTE — ASSESSMENT & PLAN NOTE
-We will initiate statins and follow up repeat lipid panel  -Diet and lifestyle modifications discussed

## 2024-12-04 SDOH — ECONOMIC STABILITY: FOOD INSECURITY: WITHIN THE PAST 12 MONTHS, THE FOOD YOU BOUGHT JUST DIDN'T LAST AND YOU DIDN'T HAVE MONEY TO GET MORE.: NEVER TRUE

## 2024-12-04 SDOH — ECONOMIC STABILITY: INCOME INSECURITY: HOW HARD IS IT FOR YOU TO PAY FOR THE VERY BASICS LIKE FOOD, HOUSING, MEDICAL CARE, AND HEATING?: NOT VERY HARD

## 2024-12-04 SDOH — HEALTH STABILITY: PHYSICAL HEALTH: ON AVERAGE, HOW MANY DAYS PER WEEK DO YOU ENGAGE IN MODERATE TO STRENUOUS EXERCISE (LIKE A BRISK WALK)?: 0 DAYS

## 2024-12-04 SDOH — ECONOMIC STABILITY: FOOD INSECURITY: WITHIN THE PAST 12 MONTHS, YOU WORRIED THAT YOUR FOOD WOULD RUN OUT BEFORE YOU GOT MONEY TO BUY MORE.: NEVER TRUE

## 2024-12-04 SDOH — HEALTH STABILITY: PHYSICAL HEALTH: ON AVERAGE, HOW MANY MINUTES DO YOU ENGAGE IN EXERCISE AT THIS LEVEL?: 10 MIN

## 2024-12-04 SDOH — ECONOMIC STABILITY: INCOME INSECURITY: IN THE LAST 12 MONTHS, WAS THERE A TIME WHEN YOU WERE NOT ABLE TO PAY THE MORTGAGE OR RENT ON TIME?: NO

## 2024-12-04 SDOH — HEALTH STABILITY: MENTAL HEALTH
STRESS IS WHEN SOMEONE FEELS TENSE, NERVOUS, ANXIOUS, OR CAN'T SLEEP AT NIGHT BECAUSE THEIR MIND IS TROUBLED. HOW STRESSED ARE YOU?: ONLY A LITTLE

## 2024-12-04 ASSESSMENT — SOCIAL DETERMINANTS OF HEALTH (SDOH)
HOW OFTEN DO YOU ATTEND CHURCH OR RELIGIOUS SERVICES?: 1 TO 4 TIMES PER YEAR
WITHIN THE PAST 12 MONTHS, YOU WORRIED THAT YOUR FOOD WOULD RUN OUT BEFORE YOU GOT THE MONEY TO BUY MORE: NEVER TRUE
IN THE PAST 12 MONTHS, HAS THE ELECTRIC, GAS, OIL, OR WATER COMPANY THREATENED TO SHUT OFF SERVICE IN YOUR HOME?: NO
DO YOU BELONG TO ANY CLUBS OR ORGANIZATIONS SUCH AS CHURCH GROUPS UNIONS, FRATERNAL OR ATHLETIC GROUPS, OR SCHOOL GROUPS?: NO
HOW OFTEN DO YOU ATTENT MEETINGS OF THE CLUB OR ORGANIZATION YOU BELONG TO?: NEVER
HOW OFTEN DO YOU GET TOGETHER WITH FRIENDS OR RELATIVES?: MORE THAN THREE TIMES A WEEK
IN A TYPICAL WEEK, HOW MANY TIMES DO YOU TALK ON THE PHONE WITH FAMILY, FRIENDS, OR NEIGHBORS?: MORE THAN THREE TIMES A WEEK
DO YOU BELONG TO ANY CLUBS OR ORGANIZATIONS SUCH AS CHURCH GROUPS UNIONS, FRATERNAL OR ATHLETIC GROUPS, OR SCHOOL GROUPS?: NO
HOW OFTEN DO YOU ATTENT MEETINGS OF THE CLUB OR ORGANIZATION YOU BELONG TO?: NEVER
HOW MANY DRINKS CONTAINING ALCOHOL DO YOU HAVE ON A TYPICAL DAY WHEN YOU ARE DRINKING: PATIENT DOES NOT DRINK
ARE YOU MARRIED, WIDOWED, DIVORCED, SEPARATED, NEVER MARRIED, OR LIVING WITH A PARTNER?: NEVER MARRIED
HOW OFTEN DO YOU ATTEND CHURCH OR RELIGIOUS SERVICES?: 1 TO 4 TIMES PER YEAR
ARE YOU MARRIED, WIDOWED, DIVORCED, SEPARATED, NEVER MARRIED, OR LIVING WITH A PARTNER?: NEVER MARRIED
IN A TYPICAL WEEK, HOW MANY TIMES DO YOU TALK ON THE PHONE WITH FAMILY, FRIENDS, OR NEIGHBORS?: MORE THAN THREE TIMES A WEEK
HOW HARD IS IT FOR YOU TO PAY FOR THE VERY BASICS LIKE FOOD, HOUSING, MEDICAL CARE, AND HEATING?: NOT VERY HARD
HOW OFTEN DO YOU HAVE A DRINK CONTAINING ALCOHOL: NEVER
HOW OFTEN DO YOU GET TOGETHER WITH FRIENDS OR RELATIVES?: MORE THAN THREE TIMES A WEEK
HOW OFTEN DO YOU HAVE SIX OR MORE DRINKS ON ONE OCCASION: NEVER

## 2024-12-04 ASSESSMENT — LIFESTYLE VARIABLES
AUDIT-C TOTAL SCORE: 0
HOW OFTEN DO YOU HAVE A DRINK CONTAINING ALCOHOL: NEVER
HOW OFTEN DO YOU HAVE SIX OR MORE DRINKS ON ONE OCCASION: NEVER
HOW MANY STANDARD DRINKS CONTAINING ALCOHOL DO YOU HAVE ON A TYPICAL DAY: PATIENT DOES NOT DRINK
SKIP TO QUESTIONS 9-10: 1

## 2024-12-06 ENCOUNTER — HOSPITAL ENCOUNTER (OUTPATIENT)
Facility: MEDICAL CENTER | Age: 54
End: 2024-12-06
Attending: NURSE PRACTITIONER
Payer: COMMERCIAL

## 2024-12-06 ENCOUNTER — OFFICE VISIT (OUTPATIENT)
Dept: MEDICAL GROUP | Facility: MEDICAL CENTER | Age: 54
End: 2024-12-06
Payer: COMMERCIAL

## 2024-12-06 VITALS
DIASTOLIC BLOOD PRESSURE: 86 MMHG | TEMPERATURE: 98.3 F | WEIGHT: 182.2 LBS | HEART RATE: 81 BPM | OXYGEN SATURATION: 98 % | SYSTOLIC BLOOD PRESSURE: 134 MMHG | HEIGHT: 60 IN | BODY MASS INDEX: 35.77 KG/M2

## 2024-12-06 DIAGNOSIS — Z11.3 ROUTINE SCREENING FOR STI (SEXUALLY TRANSMITTED INFECTION): ICD-10-CM

## 2024-12-06 DIAGNOSIS — Z01.419 WELL FEMALE EXAM WITH ROUTINE GYNECOLOGICAL EXAM: ICD-10-CM

## 2024-12-06 DIAGNOSIS — Z11.51 SCREENING FOR HPV (HUMAN PAPILLOMAVIRUS): ICD-10-CM

## 2024-12-06 PROCEDURE — 3075F SYST BP GE 130 - 139MM HG: CPT | Performed by: NURSE PRACTITIONER

## 2024-12-06 PROCEDURE — 87591 N.GONORRHOEAE DNA AMP PROB: CPT

## 2024-12-06 PROCEDURE — 3079F DIAST BP 80-89 MM HG: CPT | Performed by: NURSE PRACTITIONER

## 2024-12-06 PROCEDURE — 99396 PREV VISIT EST AGE 40-64: CPT | Performed by: NURSE PRACTITIONER

## 2024-12-06 PROCEDURE — 88142 CYTOPATH C/V THIN LAYER: CPT

## 2024-12-06 PROCEDURE — 87624 HPV HI-RISK TYP POOLED RSLT: CPT

## 2024-12-06 PROCEDURE — 87491 CHLMYD TRACH DNA AMP PROBE: CPT

## 2024-12-06 ASSESSMENT — FIBROSIS 4 INDEX: FIB4 SCORE: 1.57

## 2024-12-06 NOTE — PROGRESS NOTES
Established Patient    CC: Nanda Gutierrez is a 54 y.o.,female who presents today for Pap and pelvic exam. No complaints today.    HPI:       History of Present Illness           She has been menopausal since age:50.  She has not utilized HRT.  Currently taking: none  Her menstrual history is unremarkable.   Her form of contraception is  none  Hx of abnormal pelvic exams: No .   Last Pap >3 yrs ago.  Hx of abnormal Pap no  OB History    Para Term  AB Living   0 0 0 0 0 0   SAB IAB Ectopic Molar Multiple Live Births   0 0 0 0 0 0      Social History     Substance and Sexual Activity   Sexual Activity Yes    Partners: Male    Comment: with one male partner since around      Sexual history: single partner   She  reports that she has never smoked. She has never used smokeless tobacco.  Patient denies any current symptoms: No dyspareunia, no itching, no discharge, and no lesions.  Patient denies fam Hx of GYN or breast cancers.   She is , P:0.   Previous pregnancies, without significant complications. N/a  Patient denies any STD risks or concerns.   Patient denies any history of sexual abuse.   Patient denies breast masses or lesions .  Last mammo: 2023 (due at 40 )    No problem-specific Assessment & Plan notes found for this encounter.          Allergies: Pcn [penicillins]    Current Outpatient Medications   Medication Sig Dispense Refill    ibuprofen (MOTRIN) 800 MG Tab TAKE 1 TABLET BY MOUTH EVERY 8 HOURS AS NEEDED FOR MILD PAIN 90 Tab 0    atorvastatin (LIPITOR) 10 MG Tab Take 1 Tablet by mouth every evening for 90 days. (Patient not taking: Reported on 2024) 90 Tablet 0     No current facility-administered medications for this visit.     Patient Active Problem List    Diagnosis Date Noted    Cervical lymphadenopathy 2024    Elevated serum globulin level 2024    Pneumonia of both lungs due to infectious organism 2024    Hot flashes 2024    NSAID long-term use  01/29/2019    Eczema 01/29/2019    Excessive daytime sleepiness 05/19/2017    Thyroid nodule 05/19/2017    Class 1 obesity due to excess calories without serious comorbidity with body mass index (BMI) of 34.0 to 34.9 in adult 07/07/2015    Prediabetes 12/30/2014    Thoracic back pain 12/22/2014    Vitamin D deficiency 05/10/2014    Dyslipidemia 05/10/2014    Chronic headaches 04/30/2014     Current Outpatient Medications on File Prior to Visit   Medication Sig Dispense Refill    ibuprofen (MOTRIN) 800 MG Tab TAKE 1 TABLET BY MOUTH EVERY 8 HOURS AS NEEDED FOR MILD PAIN 90 Tab 0    atorvastatin (LIPITOR) 10 MG Tab Take 1 Tablet by mouth every evening for 90 days. (Patient not taking: Reported on 12/6/2024) 90 Tablet 0     No current facility-administered medications on file prior to visit.     Family History   Problem Relation Age of Onset    Diabetes Mother     Stroke Mother     Heart Disease Mother     Hypertension Mother     Stroke Father         possibly not a stroke? possibly TIA?     Cancer Maternal Grandmother 56        pancreatic    Heart Disease Maternal Grandfather 40        MI    Diabetes Paternal Grandmother     Depression Child      Social History     Tobacco Use    Smoking status: Never    Smokeless tobacco: Never   Substance Use Topics    Alcohol use: No       Allergies, past medical history, past surgical history, medications, family history, social history reviewed and updated.    Exercise: moderate regular exercise program  Her preventative health screens are up to date.    ROS:  Constitutional: Denies fevers or chills  Eyes: Denies changes in vision  Ears/Nose/Throat/Mouth: Denies nasal congestion or sore throat   Cardiovascular: Denies chest pain or palpitations   Respiratory: Denies shortness of breath, Denies cough  Gastrointestinal/Hepatic: Denies abdominal pain, nausea, vomiting   Genitourinary: Denies dysuria or frequency  Musculoskeletal/Rheum: Denies joint pain and swelling   Neurological:  Denies headache or visual changes  Psychiatric: Denies mood disorder   Endocrine: Denies hx of diabetes or thyroid dysfunction  Heme/Oncology/Lymph Nodes: Denies weight changes or enlarged LNs.    GYN ROS:    Reports moderate menopause symptoms of hot flashes, night sweats, sleep disruption, mood changes.Denies vaginal dryness.   Normal menses.  Cramping is none.   She does not take OTC analgesics for cramping  No significant bloating/fluid retention, no pelvic pain, or dyspareunia. No abnormal bleeding or vaginal discharge.   No breast pain or tenderness, no new or enlarging lumps on elf-exam, nipple discharge, changes in size or contour, or abnormal cyclic discomfort.  No urinary tract symptoms, no incontinence, no polydipsia, polyuria,  No abdominal pain, change in bowel habits, black or bloody stools.    No menstrual migraines   No depression, labile mood, anxiety, libido changes, insomnia.  No temperature intolerance.    /86 (BP Location: Left arm, Patient Position: Sitting, BP Cuff Size: Adult)   Pulse 81   Temp 36.8 °C (98.3 °F) (Temporal)   Ht 1.524 m (5')   Wt 82.6 kg (182 lb 3.2 oz)   SpO2 98%   BMI 35.58 kg/m²   Body mass index is 35.58 kg/m².  Vitals Noted and Reviewed    Physical Exam    Physical Exam     Pelvic exam:   External genitalia are normal in appearance, no lesions.   Vulva: grossly unremarkable, no lesions or masses noted  Vagina: no abnormal discharge, normal color  Speculum exam:  Vaginal Mucosa: normal vaginal mucosa  Cervix: parous, normal cervix and mucosa, no lesions, non-friable. No cervical motion tenderness.  Pap performed and sample collected and sent to lab.  Uterus: Normal shape, position and consistency, normal in size, no masses.  Bimanual exam: No uteromegaly, negative chandelier sign, adnexa freely movable and without enlargements bilaterally.  Rectal: not performed  Breast exam: Bilateral breasts are normal in appearance. Nipple and areola are normal in  appearance. No nipple retraction. No abnormal skin texture. No dominant masses. No axillary lymphadenopathy, no skin changes.   Breasts: breasts symmetric, no dominant or suspicious mass, no skin or nipple changes, and no axillary adenopathy    A chaperone was offered to the patient during today's exam. Chaperone name: Kylah was present.    Assessment and Plan  NormalGYN Exam  mammogram  pap smear  return annually or prn  Pap was processed and sent to the lab.    There are no diagnoses linked to this encounter.      Assessment & Plan           Plan:   mammogram  pap smear  return annually or prn  Discussed  breast self exam, mammography screening, feminine hygiene, menopause, diet and exercise, Kegel exercises     Discussed  breast self exam, diet and exercise, different methods of contraception   Follow-up in one year for annual physical.    This note was created using voice recognition software. There may be unintended errors in spelling, grammar or content.

## 2024-12-07 LAB
C TRACH DNA GENITAL QL NAA+PROBE: NEGATIVE
N GONORRHOEA DNA GENITAL QL NAA+PROBE: NEGATIVE
SPECIMEN SOURCE: NORMAL

## 2024-12-11 LAB
HPV I/H RISK 1 DNA SPEC QL NAA+PROBE: NOT DETECTED
SPECIMEN SOURCE: NORMAL
THINPREP PAP, CYTOLOGY NL11781: NORMAL

## 2024-12-19 ENCOUNTER — HOSPITAL ENCOUNTER (OUTPATIENT)
Dept: RADIOLOGY | Facility: MEDICAL CENTER | Age: 54
End: 2024-12-19
Attending: STUDENT IN AN ORGANIZED HEALTH CARE EDUCATION/TRAINING PROGRAM
Payer: COMMERCIAL

## 2024-12-19 DIAGNOSIS — R59.0 CERVICAL LYMPHADENOPATHY: ICD-10-CM

## 2024-12-19 PROCEDURE — 76536 US EXAM OF HEAD AND NECK: CPT

## 2024-12-27 ENCOUNTER — OFFICE VISIT (OUTPATIENT)
Dept: URGENT CARE | Facility: PHYSICIAN GROUP | Age: 54
End: 2024-12-27
Payer: COMMERCIAL

## 2024-12-27 VITALS
TEMPERATURE: 98.1 F | BODY MASS INDEX: 34.95 KG/M2 | SYSTOLIC BLOOD PRESSURE: 118 MMHG | DIASTOLIC BLOOD PRESSURE: 68 MMHG | WEIGHT: 178 LBS | OXYGEN SATURATION: 97 % | HEART RATE: 85 BPM | RESPIRATION RATE: 16 BRPM | HEIGHT: 60 IN

## 2024-12-27 DIAGNOSIS — R05.1 ACUTE COUGH: ICD-10-CM

## 2024-12-27 DIAGNOSIS — H66.90 ACUTE OTITIS MEDIA, UNSPECIFIED OTITIS MEDIA TYPE: ICD-10-CM

## 2024-12-27 LAB
FLUAV RNA SPEC QL NAA+PROBE: NEGATIVE
FLUBV RNA SPEC QL NAA+PROBE: NEGATIVE
RSV RNA SPEC QL NAA+PROBE: NEGATIVE
SARS-COV-2 RNA RESP QL NAA+PROBE: NEGATIVE

## 2024-12-27 PROCEDURE — 3078F DIAST BP <80 MM HG: CPT

## 2024-12-27 PROCEDURE — 0241U POCT CEPHEID COV-2, FLU A/B, RSV - PCR: CPT

## 2024-12-27 PROCEDURE — 3074F SYST BP LT 130 MM HG: CPT

## 2024-12-27 PROCEDURE — 99213 OFFICE O/P EST LOW 20 MIN: CPT

## 2024-12-27 RX ORDER — DOXYCYCLINE HYCLATE 100 MG
100 TABLET ORAL 2 TIMES DAILY
Qty: 14 TABLET | Refills: 0 | Status: SHIPPED | OUTPATIENT
Start: 2024-12-27 | End: 2025-01-03

## 2024-12-27 RX ORDER — BENZONATATE 100 MG/1
100 CAPSULE ORAL 3 TIMES DAILY PRN
Qty: 60 CAPSULE | Refills: 0 | Status: SHIPPED | OUTPATIENT
Start: 2024-12-27

## 2024-12-27 ASSESSMENT — FIBROSIS 4 INDEX: FIB4 SCORE: 1.57

## 2024-12-27 ASSESSMENT — ENCOUNTER SYMPTOMS
ABDOMINAL PAIN: 0
NECK PAIN: 0
SORE THROAT: 1
COUGH: 1
HEADACHES: 1

## 2024-12-27 NOTE — PROGRESS NOTES
Subjective     Nanda Gutierrez is a 54 y.o. female who presents with Otalgia (Bilateral. Started this morning and pain has gotten worse ), Pharyngitis, and Runny Nose (X 2-3 days)            Otalgia   There is pain in both ears. This is a new problem. The current episode started today. There has been no fever. The pain is moderate. Associated symptoms include coughing, ear discharge, headaches, hearing loss and a sore throat. Pertinent negatives include no abdominal pain, diarrhea, neck pain, rash or vomiting. Associated symptoms comments: Clear drainage from right ear. Nasal congestion and nausea. She has tried acetaminophen for the symptoms. The treatment provided mild relief. Her past medical history is significant for a tympanostomy tube. There is no history of a chronic ear infection or hearing loss.       Review of Systems   Constitutional:  Negative for chills and fever.   HENT:  Positive for congestion, ear discharge, ear pain, hearing loss and sore throat. Negative for sinus pain and tinnitus.    Eyes:  Negative for redness.   Respiratory:  Positive for cough. Negative for hemoptysis, sputum production, shortness of breath and wheezing.    Cardiovascular:  Negative for chest pain.   Gastrointestinal:  Negative for abdominal pain, diarrhea, nausea and vomiting.   Musculoskeletal:  Negative for neck pain.   Skin:  Negative for rash.   Neurological:  Positive for weakness and headaches.              Objective     /68   Pulse 85   Temp 36.7 °C (98.1 °F) (Temporal)   Resp 16   Ht 1.524 m (5')   Wt 80.7 kg (178 lb)   SpO2 97%   BMI 34.76 kg/m²      Physical Exam  Constitutional:       Appearance: Normal appearance.   HENT:      Head: Normocephalic and atraumatic.      Right Ear: There is hemotympanum. Tympanic membrane is erythematous and bulging. Tympanic membrane is not perforated.      Left Ear: Tympanic membrane normal. Tympanic membrane is not erythematous.      Nose: Congestion and rhinorrhea  present.   Eyes:      Extraocular Movements: Extraocular movements intact.      Conjunctiva/sclera: Conjunctivae normal.      Pupils: Pupils are equal, round, and reactive to light.   Pulmonary:      Effort: Pulmonary effort is normal. No respiratory distress.      Breath sounds: Normal breath sounds. No stridor. No wheezing, rhonchi or rales.   Abdominal:      General: Abdomen is flat.      Palpations: Abdomen is soft.   Musculoskeletal:         General: Normal range of motion.      Cervical back: Normal range of motion and neck supple.   Lymphadenopathy:      Cervical: No cervical adenopathy.   Skin:     General: Skin is warm and dry.   Neurological:      Mental Status: She is alert.         Assessment & Plan     This is an acute condition.  Nanda is a pleasant 54-year-old female presenting to clinic today with complaints of bilateral ear pain, right ear drainage, and nasal congestion starting 3 days ago.  Patient reports ear and nose drainage are serous.  She also reports hearing loss, mild nonproductive cough, and sore throat.  She denies fevers, wheezing, chest congestion, nausea/vomiting, chills, body aches, or shortness of breath.  She has tried tylenol with mild symptom relief.  Patient has a pmhx of tympanostomy tubes as a child, and reports her significant other has been sick with upper respiratory symptoms as well.  No recent swimming or ear trauma reported.    Assessment & Plan  Acute otitis media, unspecified otitis media type    Orders:    doxycycline (VIBRAMYCIN) 100 MG Tab; Take 1 Tablet by mouth 2 times a day for 7 days.    Pmhx, subjective findings and vitals reviewed.  Lung sounds clear, no wheezing or rhonchi.  No pain upon palpation of bilateral facial or maxillary sinuses.  Right ear with significant erythematous and bulging TM, with hemotympanum.  Left ear signs negative.  Will treat for acute otitis media with Doxycycline BID for 7 days.   Encouraged patient to continue Tylenol/Ibuprofen prn  for pain.  Will r/o viral etiology of additional upper respiratory symptoms.  Encouraged to continue OTC supportive meds PRN. Humidification, increased fluids intake, adding electrolytes to diet, gargling salt water, drinking warm liquids with honey, OTC throat spray, and rest also discussed.   Discussed side effects and interactions of OTC meds and any prescribed.  Patient instructed to return to clinic if symptoms do not improve or worsen within 5-7 days.  Also informed that if chest pain or increased shortness of breath develop to immediately follow up for evaluation in the ED. Advised of risks of not doing so.    The patient demonstrated a good understanding and agreed with the treatment plan. Denies further questions.     Acute cough    Orders:    POCT CoV-2, Flu A/B, RSV by PCR    benzonatate (TESSALON) 100 MG Cap; Take 1 Capsule by mouth 3 times a day as needed for Cough.       Results for orders placed or performed in visit on 12/27/24   POCT CoV-2, Flu A/B, RSV by PCR    Collection Time: 12/27/24  2:13 PM   Result Value Ref Range    SARS-CoV-2 by PCR Negative Negative, Invalid    Influenza virus A RNA Negative Negative, Invalid    Influenza virus B, PCR Negative Negative, Invalid    RSV, PCR Negative Negative, Invalid

## 2024-12-28 ASSESSMENT — ENCOUNTER SYMPTOMS
SHORTNESS OF BREATH: 0
VOMITING: 0
EYE REDNESS: 0
CHILLS: 0
WEAKNESS: 1
HEMOPTYSIS: 0
DIARRHEA: 0
WHEEZING: 0
NAUSEA: 0
FEVER: 0
SPUTUM PRODUCTION: 0
SINUS PAIN: 0

## 2025-01-03 ENCOUNTER — OFFICE VISIT (OUTPATIENT)
Dept: URGENT CARE | Facility: PHYSICIAN GROUP | Age: 55
End: 2025-01-03
Payer: COMMERCIAL

## 2025-01-03 VITALS
HEART RATE: 98 BPM | SYSTOLIC BLOOD PRESSURE: 122 MMHG | RESPIRATION RATE: 16 BRPM | BODY MASS INDEX: 34.95 KG/M2 | WEIGHT: 178 LBS | HEIGHT: 60 IN | DIASTOLIC BLOOD PRESSURE: 74 MMHG | OXYGEN SATURATION: 98 % | TEMPERATURE: 97.8 F

## 2025-01-03 DIAGNOSIS — H66.002 ACUTE SUPPURATIVE OTITIS MEDIA OF LEFT EAR WITHOUT SPONTANEOUS RUPTURE OF TYMPANIC MEMBRANE, RECURRENCE NOT SPECIFIED: ICD-10-CM

## 2025-01-03 PROCEDURE — 3074F SYST BP LT 130 MM HG: CPT | Performed by: FAMILY MEDICINE

## 2025-01-03 PROCEDURE — 99213 OFFICE O/P EST LOW 20 MIN: CPT | Performed by: FAMILY MEDICINE

## 2025-01-03 PROCEDURE — 3078F DIAST BP <80 MM HG: CPT | Performed by: FAMILY MEDICINE

## 2025-01-03 RX ORDER — CEFDINIR 300 MG/1
300 CAPSULE ORAL 2 TIMES DAILY
Qty: 14 CAPSULE | Refills: 0 | Status: SHIPPED | OUTPATIENT
Start: 2025-01-03 | End: 2025-01-10

## 2025-01-03 ASSESSMENT — FIBROSIS 4 INDEX: FIB4 SCORE: 1.57

## 2025-01-03 NOTE — PROGRESS NOTES
Subjective     Nanda Gutierrez is a 54 y.o. female who presents with Follow-Up (Pt states no change on her ear pain. Pt states lots of pain and pressure)            1 week right earache. Initial drainage resolved. No fever. Pain persists despite course of doxycycline. Hearing is muffled. No trauma/barotrauma. No other aggravating or alleviating factors.          Review of Systems   Constitutional:  Negative for malaise/fatigue and weight loss.   Eyes:  Negative for discharge and redness.   Gastrointestinal:  Negative for nausea and vomiting.   Musculoskeletal:  Negative for joint pain and myalgias.   Skin:  Negative for itching and rash.              Objective     /74   Pulse 98   Temp 36.6 °C (97.8 °F) (Temporal)   Resp 16   Ht 1.524 m (5')   Wt 80.7 kg (178 lb)   SpO2 98%   BMI 34.76 kg/m²      Physical Exam  Constitutional:       General: She is not in acute distress.     Appearance: She is well-developed.   HENT:      Head: Normocephalic and atraumatic.      Left Ear: Tympanic membrane normal.      Ears:      Comments: Right TM is dull, bulging, and mildly red       Nose: Congestion present.      Mouth/Throat:      Mouth: Mucous membranes are moist.      Pharynx: No posterior oropharyngeal erythema.   Eyes:      Conjunctiva/sclera: Conjunctivae normal.   Cardiovascular:      Rate and Rhythm: Normal rate and regular rhythm.      Heart sounds: Normal heart sounds. No murmur heard.  Pulmonary:      Effort: Pulmonary effort is normal.      Breath sounds: Normal breath sounds. No wheezing.   Skin:     General: Skin is warm and dry.      Findings: No rash.   Neurological:      Mental Status: She is alert.                             Assessment & Plan        1. Acute suppurative otitis media of left ear without spontaneous rupture of tympanic membrane, recurrence not specified  cefdinir (OMNICEF) 300 MG Cap        Differential diagnosis, natural history, supportive care, and indications for immediate  follow-up were discussed.

## 2025-01-06 ASSESSMENT — ENCOUNTER SYMPTOMS
WEIGHT LOSS: 0
VOMITING: 0
EYE DISCHARGE: 0
EYE REDNESS: 0
MYALGIAS: 0
NAUSEA: 0

## 2025-03-24 ENCOUNTER — PATIENT MESSAGE (OUTPATIENT)
Dept: MEDICAL GROUP | Facility: MEDICAL CENTER | Age: 55
End: 2025-03-24
Payer: COMMERCIAL

## 2025-03-24 DIAGNOSIS — R23.2 HOT FLASHES: ICD-10-CM

## 2025-03-27 NOTE — TELEPHONE ENCOUNTER
Called, spoke to patient, to inform her that an appointment wasn't needed for a referral. Patient decided to cancel appointment

## 2025-03-31 NOTE — Clinical Note
REFERRAL APPROVAL NOTICE         Sent on March 31, 2025                   Nanda Gutierrez  [address] [address]                   Dear Ms. Gutierrez,    After a careful review of the medical information and benefit coverage, [facility] has processed your referral. See below for additional details.    If applicable, you must be actively enrolled with your insurance for coverage of the authorized service. If you have any questions regarding your coverage, please contact your insurance directly.    REFERRAL INFORMATION   Referral #:  [id number]  Referred-To Department    Referred-By Provider:  OB/Gyn    Kelsie Palomino M.D.   [facility]      [address]  [address]  [place] [id number] [phone] [address], [address]  [place] [address] [phone]    Referral Start Date:  03/26/2025  Referral End Date:   03/26/2026             SCHEDULING  If you do not already have an appointment, please call [phone] to make an appointment.     MORE INFORMATION  If you do not already have a [employer] account, sign up at: [email or web address]  You can access your medical information, make appointments, see lab results, billing information, and more.  If you have questions regarding this referral, please contact  the [facility] Referrals department at:             [phone]. Monday - Friday 8:00AM - 5:00PM.     Sincerely,    [facility]

## 2025-05-01 ENCOUNTER — APPOINTMENT (OUTPATIENT)
Dept: OBGYN | Facility: CLINIC | Age: 55
End: 2025-05-01
Payer: COMMERCIAL

## 2025-05-01 VITALS
BODY MASS INDEX: 35.81 KG/M2 | DIASTOLIC BLOOD PRESSURE: 77 MMHG | HEIGHT: 60 IN | SYSTOLIC BLOOD PRESSURE: 140 MMHG | WEIGHT: 182.4 LBS

## 2025-05-01 DIAGNOSIS — R23.2 HOT FLASHES: ICD-10-CM

## 2025-05-01 DIAGNOSIS — N95.1 MENOPAUSAL SYMPTOMS: ICD-10-CM

## 2025-05-01 DIAGNOSIS — N89.8 VAGINAL DRYNESS: ICD-10-CM

## 2025-05-01 PROCEDURE — 99203 OFFICE O/P NEW LOW 30 MIN: CPT | Mod: GC | Performed by: FAMILY MEDICINE

## 2025-05-01 RX ORDER — PROGESTERONE 100 MG/1
100 CAPSULE ORAL DAILY
Qty: 30 CAPSULE | Refills: 3 | Status: SHIPPED | OUTPATIENT
Start: 2025-05-01 | End: 2025-05-31

## 2025-05-01 RX ORDER — ESTRADIOL 1 MG/1
1 TABLET ORAL DAILY
Qty: 30 TABLET | Refills: 3 | Status: SHIPPED | OUTPATIENT
Start: 2025-05-01

## 2025-05-01 ASSESSMENT — FIBROSIS 4 INDEX: FIB4 SCORE: 1.57

## 2025-05-01 NOTE — PROGRESS NOTES
[facility] Women's Health  Clinic Note    ID: Nanda Gutierrez is 54 y.o. here for menopausal symptoms. .    Subjective     CC:    Chief Complaint   Patient presents with    New Patient     Hot flashes      HPI:   Patient with concern of menopause symptoms. Patient reports hot flashes started the past year now going daily. Intolerable. She reports mood changes, low libido,  vaginal dryness and pain with intercourse. Her mother started menopause in early fifties. Patient had an ablation when she was 40 years old and periods stopped then. Patient reports that she saw her PCP regarding this and started SSRI that lead to constipation so she discontinued medication. She is interested in starting hormone therapy at this time.     ROS:  Gen: denies fevers/chills, denies changes in weight  Pulm: denies shortness of breath, denies cough  CV: denies chest pain, denies palpitations  GI: denies nausea/vomiting, denies diarrhea or constipation  : denies dysuria, denies vaginal discharge or odor  Skin: denies rash    Patient Active Problem List   Diagnosis    Chronic headaches    Vitamin D deficiency    Dyslipidemia    Thoracic back pain    Prediabetes    Class 1 obesity due to excess calories without serious comorbidity with body mass index (BMI) of 34.0 to 34.9 in adult    Excessive daytime sleepiness    Thyroid nodule    NSAID long-term use    Eczema    Pneumonia of both lungs due to infectious organism    Hot flashes    Cervical lymphadenopathy    Elevated serum globulin level        Current Outpatient Medications   Medication Instructions    benzonatate (TESSALON) 100 mg, Oral, 3 TIMES DAILY PRN    estradiol (ESTRACE) 1 mg, Oral, DAILY    ibuprofen (MOTRIN) 800 MG Tab TAKE 1 TABLET BY MOUTH EVERY 8 HOURS AS NEEDED FOR MILD PAIN    progesterone (PROMETRIUM) 100 mg, Oral, DAILY        Objective:     BP (!) 140/77 (BP Location: Left arm, Patient Position: Sitting, BP Cuff Size: Large adult long)   Ht 5'   Wt 182 lb 6.4 oz   BMI  35.62 kg/m²     Gen: Alert and oriented, No apparent distress.  Lungs: Breathing comfortably on room air, no cough  CV: Extremities are warm and well perfused, no BLE edema  MSK: Normal movement of extremities, gait normal    Assessment & Plan:     Nanda Gutierrez is 54 y.o. here for hot flashes. Patient has noticed worsening hot flashes for the past year. Menses stopped after ablation 14 years ago. Patient trailed SNRI with worsening constipation so discontinued medication. Patient would like to proceed with HRT medication. Discussed different options of HRT. Patient is agreeable to trying oral estrogen and progesterone. Will start patient on medication and have patient follow up within 3-6 months if she feels her symptoms are not improving.     1. Hot flashes  - estradiol (ESTRACE) 1 MG Tab; Take 1 Tablet by mouth every day.  Dispense: 30 Tablet; Refill: 3  - progesterone (PROMETRIUM) 100 MG Cap; Take 1 Capsule by mouth every day for 30 days.  Dispense: 30 Capsule; Refill: 3    Return to clinic within 1 year to review HRT .    Nola Mendez M.D. PG-[facility]

## 2025-05-01 NOTE — PROGRESS NOTES
Patient here for GYN visit - NP, hot flashes   LMP :  Ablation, age 40 y.o.  Pap : 12/6/24 NILM (-) HPV, had abnormal cells one all normal since then   Mammo : 9/20/23  Phone/Pharmacy verified: [phone]    Pt states hot flashes weight gain, hair loss, leg cramps, fatigued, and urges to eat and cravings

## 2025-05-13 ENCOUNTER — HOSPITAL ENCOUNTER (OUTPATIENT)
Dept: LAB | Facility: MEDICAL CENTER | Age: 55
End: 2025-05-13
Attending: STUDENT IN AN ORGANIZED HEALTH CARE EDUCATION/TRAINING PROGRAM
Payer: COMMERCIAL

## 2025-05-13 DIAGNOSIS — R77.1 ELEVATED SERUM GLOBULIN LEVEL: ICD-10-CM

## 2025-05-13 DIAGNOSIS — E78.5 DYSLIPIDEMIA: ICD-10-CM

## 2025-05-13 LAB
ALBUMIN SERPL BCP-MCNC: 4.1 G/DL (ref 3.2–4.9)
ALBUMIN/GLOB SERPL: 1 G/DL
ALP SERPL-CCNC: 130 U/L (ref 30–99)
ALT SERPL-CCNC: 15 U/L (ref 2–50)
ANION GAP SERPL CALC-SCNC: 9 MMOL/L (ref 7–16)
AST SERPL-CCNC: 37 U/L (ref 12–45)
BASOPHILS # BLD AUTO: 0.6 % (ref 0–1.8)
BASOPHILS # BLD: 0.04 K/UL (ref 0–0.12)
BILIRUB SERPL-MCNC: 0.5 MG/DL (ref 0.1–1.5)
BUN SERPL-MCNC: 11 MG/DL (ref 8–22)
CALCIUM ALBUM COR SERPL-MCNC: 9.6 MG/DL (ref 8.5–10.5)
CALCIUM SERPL-MCNC: 9.7 MG/DL (ref 8.5–10.5)
CHLORIDE SERPL-SCNC: 104 MMOL/L (ref 96–112)
CHOLEST SERPL-MCNC: 197 MG/DL (ref 100–199)
CO2 SERPL-SCNC: 25 MMOL/L (ref 20–33)
CREAT SERPL-MCNC: 0.75 MG/DL (ref 0.5–1.4)
EOSINOPHIL # BLD AUTO: 0.2 K/UL (ref 0–0.51)
EOSINOPHIL NFR BLD: 3.2 % (ref 0–6.9)
ERYTHROCYTE [DISTWIDTH] IN BLOOD BY AUTOMATED COUNT: 41.2 FL (ref 35.9–50)
FASTING STATUS PATIENT QL REPORTED: NORMAL
GFR SERPLBLD CREATININE-BSD FMLA CKD-EPI: 94 ML/MIN/1.73 M 2
GLOBULIN SER CALC-MCNC: 4.3 G/DL (ref 1.9–3.5)
GLUCOSE SERPL-MCNC: 102 MG/DL (ref 65–99)
HCT VFR BLD AUTO: 40.1 % (ref 37–47)
HDLC SERPL-MCNC: 47 MG/DL
HGB BLD-MCNC: 12.9 G/DL (ref 12–16)
IMM GRANULOCYTES # BLD AUTO: 0.03 K/UL (ref 0–0.11)
IMM GRANULOCYTES NFR BLD AUTO: 0.5 % (ref 0–0.9)
LDLC SERPL CALC-MCNC: 123 MG/DL
LYMPHOCYTES # BLD AUTO: 1.21 K/UL (ref 1–4.8)
LYMPHOCYTES NFR BLD: 19.5 % (ref 22–41)
MCH RBC QN AUTO: 26.4 PG (ref 27–33)
MCHC RBC AUTO-ENTMCNC: 32.2 G/DL (ref 32.2–35.5)
MCV RBC AUTO: 82.2 FL (ref 81.4–97.8)
MONOCYTES # BLD AUTO: 0.35 K/UL (ref 0–0.85)
MONOCYTES NFR BLD AUTO: 5.6 % (ref 0–13.4)
NEUTROPHILS # BLD AUTO: 4.39 K/UL (ref 1.82–7.42)
NEUTROPHILS NFR BLD: 70.6 % (ref 44–72)
NRBC # BLD AUTO: 0 K/UL
NRBC BLD-RTO: 0 /100 WBC (ref 0–0.2)
PLATELET # BLD AUTO: 302 K/UL (ref 164–446)
PMV BLD AUTO: 11 FL (ref 9–12.9)
POTASSIUM SERPL-SCNC: 4.5 MMOL/L (ref 3.6–5.5)
PROT SERPL-MCNC: 8.4 G/DL (ref 6–8.2)
RBC # BLD AUTO: 4.88 M/UL (ref 4.2–5.4)
SODIUM SERPL-SCNC: 138 MMOL/L (ref 135–145)
TRIGL SERPL-MCNC: 137 MG/DL (ref 0–149)
WBC # BLD AUTO: 6.2 K/UL (ref 4.8–10.8)

## 2025-05-13 PROCEDURE — 80061 LIPID PANEL: CPT

## 2025-05-13 PROCEDURE — 36415 COLL VENOUS BLD VENIPUNCTURE: CPT

## 2025-05-13 PROCEDURE — 80053 COMPREHEN METABOLIC PANEL: CPT

## 2025-05-13 PROCEDURE — 85025 COMPLETE CBC W/AUTO DIFF WBC: CPT

## 2025-05-19 ENCOUNTER — OFFICE VISIT (OUTPATIENT)
Dept: MEDICAL GROUP | Facility: MEDICAL CENTER | Age: 55
End: 2025-05-19
Payer: COMMERCIAL

## 2025-05-19 VITALS
TEMPERATURE: 98.9 F | DIASTOLIC BLOOD PRESSURE: 72 MMHG | OXYGEN SATURATION: 100 % | BODY MASS INDEX: 33.71 KG/M2 | HEART RATE: 67 BPM | SYSTOLIC BLOOD PRESSURE: 134 MMHG | HEIGHT: 61 IN | WEIGHT: 178.57 LBS

## 2025-05-19 DIAGNOSIS — H91.91 HEARING LOSS OF RIGHT EAR, UNSPECIFIED HEARING LOSS TYPE: ICD-10-CM

## 2025-05-19 DIAGNOSIS — Z12.31 ENCOUNTER FOR SCREENING MAMMOGRAM FOR BREAST CANCER: Primary | ICD-10-CM

## 2025-05-19 DIAGNOSIS — R74.8 ELEVATED ALKALINE PHOSPHATASE LEVEL: ICD-10-CM

## 2025-05-19 DIAGNOSIS — L29.9 EAR ITCHING: ICD-10-CM

## 2025-05-19 PROCEDURE — 3078F DIAST BP <80 MM HG: CPT | Performed by: STUDENT IN AN ORGANIZED HEALTH CARE EDUCATION/TRAINING PROGRAM

## 2025-05-19 PROCEDURE — 3075F SYST BP GE 130 - 139MM HG: CPT | Performed by: STUDENT IN AN ORGANIZED HEALTH CARE EDUCATION/TRAINING PROGRAM

## 2025-05-19 PROCEDURE — 99214 OFFICE O/P EST MOD 30 MIN: CPT | Performed by: STUDENT IN AN ORGANIZED HEALTH CARE EDUCATION/TRAINING PROGRAM

## 2025-05-19 RX ORDER — HYDROCORTISONE AND ACETIC ACID 20.75; 10.375 MG/ML; MG/ML
2 SOLUTION AURICULAR (OTIC) 2 TIMES DAILY
Qty: 10 ML | Refills: 0 | Status: SHIPPED | OUTPATIENT
Start: 2025-05-19 | End: 2025-05-26

## 2025-05-19 ASSESSMENT — PATIENT HEALTH QUESTIONNAIRE - PHQ9: CLINICAL INTERPRETATION OF PHQ2 SCORE: 0

## 2025-05-19 ASSESSMENT — FIBROSIS 4 INDEX: FIB4 SCORE: 1.71

## 2025-05-19 NOTE — PROGRESS NOTES
"Verbal consent was acquired by the patient to use [employer] ambient listening note generation during this visit     Subjective:     HPI:   History of Present Illness  The patient presents for evaluation of  hearing loss, pruritus, and health maintenance.    She has been experiencing persistent pruritus in her left ear, which she describes as feeling like dry skin. She has not inserted any foreign objects into her ear. She reports no pain in the left ear.    She also reports a decrease in auditory acuity in her right ear. She has a history of recurrent ear issues, including an ear infection approximately 3 to 4 months ago, which was associated with a severe cold. Despite the resolution of the cold, her hearing did not improve. She was advised to use an allergy spray to open the ear canal, but this proved ineffective. Over time, her condition improved, but she continues to experience abnormal sensations in her ear. She did not consult an ENT specialist at the time and only sought care from urgent care physicians.      She has been on hormone therapy for slightly over a week and reports no significant changes, as she was informed that noticeable effects may take up to a month.         Objective:     Exam:  /72 (BP Location: Left arm, Patient Position: Sitting, BP Cuff Size: Adult)   Pulse 67   Temp 37.2 °C (98.9 °F) (Temporal)   Ht 1.549 m (5' 1\")   Wt 81 kg (178 lb 9.2 oz)   SpO2 100%   BMI 33.74 kg/m²  Body mass index is 33.74 kg/m².    ROS as above  Physical Exam  Constitutional:       Appearance: Normal appearance.   HENT:      Right Ear: There is no impacted cerumen.      Left Ear: There is no impacted cerumen.   Cardiovascular:      Rate and Rhythm: Normal rate and regular rhythm.      Pulses: Normal pulses.      Heart sounds: Normal heart sounds. No murmur heard.  Pulmonary:      Effort: Pulmonary effort is normal. No respiratory distress.      Breath sounds: Normal breath sounds. No wheezing. "   Neurological:      General: No focal deficit present.      Mental Status: She is alert and oriented to person, place, and time.               Assessment & Plan:     Assessment & Plan      Problem List Items Addressed This Visit       Elevated alkaline phosphatase level    Asymptomatic, stable  - Monitor         Hearing loss of right ear    - Referral to ENT placed         Relevant Orders    Referral to ENT    Ear itching    - Advised to avoid inserting anything into the ear to prevent further irritation.  - Prescription for eardrops provided, to be applied to the left ear for 7 to 14 days depending on symptom improvement.         Relevant Medications    acetic acid-hydrocortisone (VOSOL-HC) 1-2 % Solution     Other Visit Diagnoses         Encounter for screening mammogram for breast cancer    -  Primary    Relevant Orders    MA-SCREENING MAMMO BILAT W/TOMOSYNTHESIS W/CAD                Return if symptoms worsen or fail to improve.    Please note that this dictation was created using voice recognition software. I have made every reasonable attempt to correct obvious errors, but I expect that there are errors of grammar and possibly content that I did not discover before finalizing the note.

## 2025-05-19 NOTE — ASSESSMENT & PLAN NOTE
- Advised to avoid inserting anything into the ear to prevent further irritation.  - Prescription for eardrops provided, to be applied to the left ear for 7 to 14 days depending on symptom improvement.

## 2025-05-22 NOTE — Clinical Note
REFERRAL APPROVAL NOTICE         Sent on May 22, 2025                   Nanda Gutierrez  [address] [facility], [place] [address]                   Dear Ms. Gutierrez,    After a careful review of the medical information and benefit coverage, [facility] has processed your referral. See below for additional details.    If applicable, you must be actively enrolled with your insurance for coverage of the authorized service. If you have any questions regarding your coverage, please contact your insurance directly.    REFERRAL INFORMATION   Referral #:  [id number]  Referred-To Provider    Referred-By Provider:  Otolaryngology    VASILE Gordon JEREMY D      [address]  [address]  [facility] [id number] [phone] [address] [facility] [address]  [phone]    Referral Start Date:  05/19/2025  Referral End Date:   05/19/2026             SCHEDULING  If you do not already have an appointment, please call [phone] to make an appointment.     MORE INFORMATION  If you do not already have a [facility] account, sign up at: [email or web address]  You can access your medical information, make appointments, see lab results, billing information, and more.  If you have questions regarding this referral, please contact  the [facility] Referrals department at:             [phone]. Monday - Friday 8:00AM - 5:00PM.     Sincerely,    [facility]

## 2025-08-13 ENCOUNTER — PATIENT MESSAGE (OUTPATIENT)
Dept: MEDICAL GROUP | Facility: MEDICAL CENTER | Age: 55
End: 2025-08-13
Payer: COMMERCIAL

## 2025-08-28 DIAGNOSIS — R23.2 HOT FLASHES: ICD-10-CM

## 2025-08-28 DIAGNOSIS — R23.2 HOT FLASHES: Primary | ICD-10-CM

## 2025-08-28 RX ORDER — PROGESTERONE 100 MG/1
100 CAPSULE ORAL DAILY
Qty: 30 CAPSULE | Refills: 0 | Status: SHIPPED | OUTPATIENT
Start: 2025-08-28

## 2025-08-28 RX ORDER — ESTRADIOL 1 MG/1
1 TABLET ORAL DAILY
Qty: 30 TABLET | Refills: 3 | Status: SHIPPED | OUTPATIENT
Start: 2025-08-28